# Patient Record
Sex: FEMALE | Race: WHITE | URBAN - METROPOLITAN AREA
[De-identification: names, ages, dates, MRNs, and addresses within clinical notes are randomized per-mention and may not be internally consistent; named-entity substitution may affect disease eponyms.]

---

## 2017-05-08 ENCOUNTER — TRANSFERRED RECORDS (OUTPATIENT)
Dept: HEALTH INFORMATION MANAGEMENT | Facility: CLINIC | Age: 72
End: 2017-05-08

## 2017-05-08 ENCOUNTER — HOSPITAL LABORATORY (OUTPATIENT)
Dept: OTHER | Facility: CLINIC | Age: 72
End: 2017-05-08

## 2017-05-08 LAB — HGB BLD-MCNC: 13.3 G/DL (ref 11.7–15.7)

## 2017-05-16 RX ORDER — METHYLPHENIDATE HYDROCHLORIDE 18 MG/1
18 TABLET, EXTENDED RELEASE ORAL EVERY MORNING
Status: ON HOLD | COMMUNITY
End: 2017-05-26

## 2017-05-16 RX ORDER — PRENATAL VIT/IRON FUM/FOLIC AC 27MG-0.8MG
1 TABLET ORAL DAILY
COMMUNITY

## 2017-05-16 RX ORDER — LOSARTAN POTASSIUM AND HYDROCHLOROTHIAZIDE 25; 100 MG/1; MG/1
1 TABLET ORAL EVERY MORNING
COMMUNITY

## 2017-05-16 RX ORDER — ALBUTEROL SULFATE 90 UG/1
2 AEROSOL, METERED RESPIRATORY (INHALATION) EVERY 6 HOURS PRN
COMMUNITY

## 2017-05-16 RX ORDER — BUDESONIDE AND FORMOTEROL FUMARATE DIHYDRATE 80; 4.5 UG/1; UG/1
2 AEROSOL RESPIRATORY (INHALATION) 2 TIMES DAILY
COMMUNITY

## 2017-05-22 ENCOUNTER — APPOINTMENT (OUTPATIENT)
Dept: GENERAL RADIOLOGY | Facility: CLINIC | Age: 72
DRG: 470 | End: 2017-05-22
Attending: ORTHOPAEDIC SURGERY
Payer: MEDICARE

## 2017-05-22 ENCOUNTER — HOSPITAL ENCOUNTER (INPATIENT)
Facility: CLINIC | Age: 72
LOS: 4 days | Discharge: NURSING, REHAB OR LONG-TERM CARE | DRG: 470 | End: 2017-05-26
Attending: ORTHOPAEDIC SURGERY | Admitting: ORTHOPAEDIC SURGERY
Payer: MEDICARE

## 2017-05-22 ENCOUNTER — ANESTHESIA (OUTPATIENT)
Dept: SURGERY | Facility: CLINIC | Age: 72
DRG: 470 | End: 2017-05-22
Payer: MEDICARE

## 2017-05-22 ENCOUNTER — ANESTHESIA EVENT (OUTPATIENT)
Dept: SURGERY | Facility: CLINIC | Age: 72
DRG: 470 | End: 2017-05-22
Payer: MEDICARE

## 2017-05-22 ENCOUNTER — APPOINTMENT (OUTPATIENT)
Dept: PHYSICAL THERAPY | Facility: CLINIC | Age: 72
DRG: 470 | End: 2017-05-22
Attending: ORTHOPAEDIC SURGERY
Payer: MEDICARE

## 2017-05-22 DIAGNOSIS — D62 ANEMIA DUE TO BLOOD LOSS, ACUTE: ICD-10-CM

## 2017-05-22 DIAGNOSIS — Z96.652 STATUS POST TOTAL LEFT KNEE REPLACEMENT: Primary | ICD-10-CM

## 2017-05-22 DIAGNOSIS — F98.8 ATTENTION DEFICIT DISORDER: ICD-10-CM

## 2017-05-22 PROBLEM — Z96.659 S/P TOTAL KNEE ARTHROPLASTY: Status: ACTIVE | Noted: 2017-05-22

## 2017-05-22 LAB
ANION GAP SERPL CALCULATED.3IONS-SCNC: 8 MMOL/L (ref 3–14)
BUN SERPL-MCNC: 16 MG/DL (ref 7–30)
CALCIUM SERPL-MCNC: 9.5 MG/DL (ref 8.5–10.1)
CHLORIDE SERPL-SCNC: 104 MMOL/L (ref 94–109)
CO2 SERPL-SCNC: 28 MMOL/L (ref 20–32)
CREAT SERPL-MCNC: 0.59 MG/DL (ref 0.52–1.04)
GFR SERPL CREATININE-BSD FRML MDRD: NORMAL ML/MIN/1.7M2
GLUCOSE SERPL-MCNC: 97 MG/DL (ref 70–99)
HGB BLD-MCNC: 12.4 G/DL (ref 11.7–15.7)
PLATELET # BLD AUTO: 255 10E9/L (ref 150–450)
POTASSIUM SERPL-SCNC: 3.8 MMOL/L (ref 3.4–5.3)
SODIUM SERPL-SCNC: 140 MMOL/L (ref 133–144)

## 2017-05-22 PROCEDURE — 80048 BASIC METABOLIC PNL TOTAL CA: CPT | Performed by: ORTHOPAEDIC SURGERY

## 2017-05-22 PROCEDURE — 25000132 ZZH RX MED GY IP 250 OP 250 PS 637: Mod: GY | Performed by: PHYSICIAN ASSISTANT

## 2017-05-22 PROCEDURE — 25000125 ZZHC RX 250: Performed by: ANESTHESIOLOGY

## 2017-05-22 PROCEDURE — 40000986 XR KNEE PORT LT 1/2 VW: Mod: LT

## 2017-05-22 PROCEDURE — 71000013 ZZH RECOVERY PHASE 1 LEVEL 1 EA ADDTL HR: Performed by: ORTHOPAEDIC SURGERY

## 2017-05-22 PROCEDURE — 25000128 H RX IP 250 OP 636: Performed by: PHYSICIAN ASSISTANT

## 2017-05-22 PROCEDURE — C1776 JOINT DEVICE (IMPLANTABLE): HCPCS | Performed by: ORTHOPAEDIC SURGERY

## 2017-05-22 PROCEDURE — 97116 GAIT TRAINING THERAPY: CPT | Mod: GP | Performed by: PHYSICAL THERAPIST

## 2017-05-22 PROCEDURE — 40000193 ZZH STATISTIC PT WARD VISIT: Performed by: PHYSICAL THERAPIST

## 2017-05-22 PROCEDURE — A9270 NON-COVERED ITEM OR SERVICE: HCPCS | Mod: GY | Performed by: ORTHOPAEDIC SURGERY

## 2017-05-22 PROCEDURE — 27810169 ZZH OR IMPLANT GENERAL: Performed by: ORTHOPAEDIC SURGERY

## 2017-05-22 PROCEDURE — 99207 ZZC CONSULT E&M CHANGED TO INITIAL LEVEL: CPT | Performed by: PHYSICIAN ASSISTANT

## 2017-05-22 PROCEDURE — 27210794 ZZH OR GENERAL SUPPLY STERILE: Performed by: ORTHOPAEDIC SURGERY

## 2017-05-22 PROCEDURE — 25000128 H RX IP 250 OP 636: Performed by: NURSE ANESTHETIST, CERTIFIED REGISTERED

## 2017-05-22 PROCEDURE — 25800025 ZZH RX 258: Performed by: ORTHOPAEDIC SURGERY

## 2017-05-22 PROCEDURE — 25000132 ZZH RX MED GY IP 250 OP 250 PS 637: Mod: GY | Performed by: ORTHOPAEDIC SURGERY

## 2017-05-22 PROCEDURE — 25000125 ZZHC RX 250: Performed by: ORTHOPAEDIC SURGERY

## 2017-05-22 PROCEDURE — 25000128 H RX IP 250 OP 636: Performed by: ORTHOPAEDIC SURGERY

## 2017-05-22 PROCEDURE — 25000128 H RX IP 250 OP 636: Performed by: ANESTHESIOLOGY

## 2017-05-22 PROCEDURE — 25000125 ZZHC RX 250: Performed by: NURSE ANESTHETIST, CERTIFIED REGISTERED

## 2017-05-22 PROCEDURE — 27210995 ZZH RX 272: Performed by: ORTHOPAEDIC SURGERY

## 2017-05-22 PROCEDURE — 71000012 ZZH RECOVERY PHASE 1 LEVEL 1 FIRST HR: Performed by: ORTHOPAEDIC SURGERY

## 2017-05-22 PROCEDURE — 12000007 ZZH R&B INTERMEDIATE

## 2017-05-22 PROCEDURE — 97161 PT EVAL LOW COMPLEX 20 MIN: CPT | Mod: GP | Performed by: PHYSICAL THERAPIST

## 2017-05-22 PROCEDURE — 0SRD0J9 REPLACEMENT OF LEFT KNEE JOINT WITH SYNTHETIC SUBSTITUTE, CEMENTED, OPEN APPROACH: ICD-10-PCS | Performed by: ORTHOPAEDIC SURGERY

## 2017-05-22 PROCEDURE — A9270 NON-COVERED ITEM OR SERVICE: HCPCS | Mod: GY | Performed by: PHYSICIAN ASSISTANT

## 2017-05-22 PROCEDURE — 85049 AUTOMATED PLATELET COUNT: CPT | Performed by: ORTHOPAEDIC SURGERY

## 2017-05-22 PROCEDURE — S0077 INJECTION, CLINDAMYCIN PHOSP: HCPCS | Performed by: ORTHOPAEDIC SURGERY

## 2017-05-22 PROCEDURE — 36000063 ZZH SURGERY LEVEL 4 EA 15 ADDTL MIN: Performed by: ORTHOPAEDIC SURGERY

## 2017-05-22 PROCEDURE — 36000093 ZZH SURGERY LEVEL 4 1ST 30 MIN: Performed by: ORTHOPAEDIC SURGERY

## 2017-05-22 PROCEDURE — 37000008 ZZH ANESTHESIA TECHNICAL FEE, 1ST 30 MIN: Performed by: ORTHOPAEDIC SURGERY

## 2017-05-22 PROCEDURE — 37000009 ZZH ANESTHESIA TECHNICAL FEE, EACH ADDTL 15 MIN: Performed by: ORTHOPAEDIC SURGERY

## 2017-05-22 PROCEDURE — 36415 COLL VENOUS BLD VENIPUNCTURE: CPT | Performed by: ORTHOPAEDIC SURGERY

## 2017-05-22 PROCEDURE — 85018 HEMOGLOBIN: CPT | Performed by: ORTHOPAEDIC SURGERY

## 2017-05-22 PROCEDURE — 97110 THERAPEUTIC EXERCISES: CPT | Mod: GP | Performed by: PHYSICAL THERAPIST

## 2017-05-22 PROCEDURE — 27110028 ZZH OR GENERAL SUPPLY NON-STERILE: Performed by: ORTHOPAEDIC SURGERY

## 2017-05-22 PROCEDURE — 99222 1ST HOSP IP/OBS MODERATE 55: CPT | Performed by: PHYSICIAN ASSISTANT

## 2017-05-22 PROCEDURE — 40000170 ZZH STATISTIC PRE-PROCEDURE ASSESSMENT II: Performed by: ORTHOPAEDIC SURGERY

## 2017-05-22 PROCEDURE — 97530 THERAPEUTIC ACTIVITIES: CPT | Mod: GP | Performed by: PHYSICAL THERAPIST

## 2017-05-22 DEVICE — IMP COMP PATELLA GENESIS II 13X29MM 71420574: Type: IMPLANTABLE DEVICE | Site: KNEE | Status: FUNCTIONAL

## 2017-05-22 DEVICE — IMP BASEPLATE TIBIAL GENESIS II SZ 2 LT TI 71420162: Type: IMPLANTABLE DEVICE | Site: KNEE | Status: FUNCTIONAL

## 2017-05-22 DEVICE — IMPLANTABLE DEVICE: Type: IMPLANTABLE DEVICE | Site: KNEE | Status: FUNCTIONAL

## 2017-05-22 DEVICE — BONE CEMENT RADIOPAQUE SIMPLEX HV FULL DOSE 6194-1-001: Type: IMPLANTABLE DEVICE | Site: KNEE | Status: FUNCTIONAL

## 2017-05-22 DEVICE — IMP COMP FEMORAL S&N LEGION CR NP NARROW 4 LT 71933641: Type: IMPLANTABLE DEVICE | Site: KNEE | Status: FUNCTIONAL

## 2017-05-22 RX ORDER — DIPHENHYDRAMINE HCL 12.5MG/5ML
12.5 LIQUID (ML) ORAL EVERY 6 HOURS PRN
Status: DISCONTINUED | OUTPATIENT
Start: 2017-05-22 | End: 2017-05-26 | Stop reason: HOSPADM

## 2017-05-22 RX ORDER — ONDANSETRON 2 MG/ML
4 INJECTION INTRAMUSCULAR; INTRAVENOUS EVERY 6 HOURS PRN
Status: DISCONTINUED | OUTPATIENT
Start: 2017-05-22 | End: 2017-05-26 | Stop reason: HOSPADM

## 2017-05-22 RX ORDER — ONDANSETRON 2 MG/ML
INJECTION INTRAMUSCULAR; INTRAVENOUS PRN
Status: DISCONTINUED | OUTPATIENT
Start: 2017-05-22 | End: 2017-05-22

## 2017-05-22 RX ORDER — LIDOCAINE 40 MG/G
CREAM TOPICAL
Status: DISCONTINUED | OUTPATIENT
Start: 2017-05-22 | End: 2017-05-26 | Stop reason: HOSPADM

## 2017-05-22 RX ORDER — SODIUM CHLORIDE, SODIUM LACTATE, POTASSIUM CHLORIDE, CALCIUM CHLORIDE 600; 310; 30; 20 MG/100ML; MG/100ML; MG/100ML; MG/100ML
INJECTION, SOLUTION INTRAVENOUS CONTINUOUS
Status: DISCONTINUED | OUTPATIENT
Start: 2017-05-22 | End: 2017-05-22 | Stop reason: HOSPADM

## 2017-05-22 RX ORDER — BUDESONIDE AND FORMOTEROL FUMARATE DIHYDRATE 80; 4.5 UG/1; UG/1
2 AEROSOL RESPIRATORY (INHALATION) 2 TIMES DAILY
Status: DISCONTINUED | OUTPATIENT
Start: 2017-05-22 | End: 2017-05-26 | Stop reason: HOSPADM

## 2017-05-22 RX ORDER — ONDANSETRON 2 MG/ML
4 INJECTION INTRAMUSCULAR; INTRAVENOUS EVERY 30 MIN PRN
Status: DISCONTINUED | OUTPATIENT
Start: 2017-05-22 | End: 2017-05-22 | Stop reason: HOSPADM

## 2017-05-22 RX ORDER — LOSARTAN POTASSIUM 100 MG/1
100 TABLET ORAL DAILY
Status: DISCONTINUED | OUTPATIENT
Start: 2017-05-23 | End: 2017-05-26 | Stop reason: HOSPADM

## 2017-05-22 RX ORDER — PROCHLORPERAZINE MALEATE 5 MG
5 TABLET ORAL EVERY 6 HOURS PRN
Status: DISCONTINUED | OUTPATIENT
Start: 2017-05-22 | End: 2017-05-26 | Stop reason: HOSPADM

## 2017-05-22 RX ORDER — FENTANYL CITRATE 50 UG/ML
INJECTION, SOLUTION INTRAMUSCULAR; INTRAVENOUS PRN
Status: DISCONTINUED | OUTPATIENT
Start: 2017-05-22 | End: 2017-05-22

## 2017-05-22 RX ORDER — FENTANYL CITRATE 50 UG/ML
25-50 INJECTION, SOLUTION INTRAMUSCULAR; INTRAVENOUS
Status: DISCONTINUED | OUTPATIENT
Start: 2017-05-22 | End: 2017-05-22 | Stop reason: HOSPADM

## 2017-05-22 RX ORDER — HYDRALAZINE HYDROCHLORIDE 20 MG/ML
10 INJECTION INTRAMUSCULAR; INTRAVENOUS EVERY 4 HOURS PRN
Status: DISCONTINUED | OUTPATIENT
Start: 2017-05-22 | End: 2017-05-26 | Stop reason: HOSPADM

## 2017-05-22 RX ORDER — LEVOTHYROXINE SODIUM 50 UG/1
50 TABLET ORAL
Status: DISCONTINUED | OUTPATIENT
Start: 2017-05-23 | End: 2017-05-26 | Stop reason: HOSPADM

## 2017-05-22 RX ORDER — EPHEDRINE SULFATE 50 MG/ML
INJECTION, SOLUTION INTRAMUSCULAR; INTRAVENOUS; SUBCUTANEOUS PRN
Status: DISCONTINUED | OUTPATIENT
Start: 2017-05-22 | End: 2017-05-22

## 2017-05-22 RX ORDER — ALBUTEROL SULFATE 0.83 MG/ML
2.5 SOLUTION RESPIRATORY (INHALATION) EVERY 4 HOURS PRN
Status: DISCONTINUED | OUTPATIENT
Start: 2017-05-22 | End: 2017-05-22 | Stop reason: HOSPADM

## 2017-05-22 RX ORDER — KETOROLAC TROMETHAMINE 15 MG/ML
15 INJECTION, SOLUTION INTRAMUSCULAR; INTRAVENOUS EVERY 6 HOURS
Status: COMPLETED | OUTPATIENT
Start: 2017-05-22 | End: 2017-05-23

## 2017-05-22 RX ORDER — CEFAZOLIN SODIUM 2 G/100ML
2 INJECTION, SOLUTION INTRAVENOUS
Status: COMPLETED | OUTPATIENT
Start: 2017-05-22 | End: 2017-05-22

## 2017-05-22 RX ORDER — CEFAZOLIN SODIUM 1 G/3ML
1 INJECTION, POWDER, FOR SOLUTION INTRAMUSCULAR; INTRAVENOUS SEE ADMIN INSTRUCTIONS
Status: DISCONTINUED | OUTPATIENT
Start: 2017-05-22 | End: 2017-05-22

## 2017-05-22 RX ORDER — FENTANYL CITRATE 50 UG/ML
25-50 INJECTION, SOLUTION INTRAMUSCULAR; INTRAVENOUS
Status: COMPLETED | OUTPATIENT
Start: 2017-05-22 | End: 2017-05-22

## 2017-05-22 RX ORDER — OXYCODONE HYDROCHLORIDE 5 MG/1
5-10 TABLET ORAL EVERY 4 HOURS PRN
Status: DISCONTINUED | OUTPATIENT
Start: 2017-05-22 | End: 2017-05-23

## 2017-05-22 RX ORDER — PROPOFOL 10 MG/ML
INJECTION, EMULSION INTRAVENOUS CONTINUOUS PRN
Status: DISCONTINUED | OUTPATIENT
Start: 2017-05-22 | End: 2017-05-22

## 2017-05-22 RX ORDER — METHYLPHENIDATE HYDROCHLORIDE 18 MG/1
18 TABLET, EXTENDED RELEASE ORAL EVERY MORNING
Status: DISCONTINUED | OUTPATIENT
Start: 2017-05-23 | End: 2017-05-22

## 2017-05-22 RX ORDER — SENNOSIDES 8.6 MG
1-2 TABLET ORAL 2 TIMES DAILY PRN
Status: DISCONTINUED | OUTPATIENT
Start: 2017-05-22 | End: 2017-05-26 | Stop reason: HOSPADM

## 2017-05-22 RX ORDER — ONDANSETRON 4 MG/1
4 TABLET, ORALLY DISINTEGRATING ORAL EVERY 6 HOURS PRN
Status: DISCONTINUED | OUTPATIENT
Start: 2017-05-22 | End: 2017-05-26 | Stop reason: HOSPADM

## 2017-05-22 RX ORDER — CALCIUM CARBONATE 500 MG/1
500 TABLET, CHEWABLE ORAL 3 TIMES DAILY PRN
Status: DISCONTINUED | OUTPATIENT
Start: 2017-05-22 | End: 2017-05-26 | Stop reason: HOSPADM

## 2017-05-22 RX ORDER — MAGNESIUM HYDROXIDE 1200 MG/15ML
LIQUID ORAL PRN
Status: DISCONTINUED | OUTPATIENT
Start: 2017-05-22 | End: 2017-05-22 | Stop reason: HOSPADM

## 2017-05-22 RX ORDER — DIPHENHYDRAMINE HYDROCHLORIDE 50 MG/ML
12.5 INJECTION INTRAMUSCULAR; INTRAVENOUS EVERY 6 HOURS PRN
Status: DISCONTINUED | OUTPATIENT
Start: 2017-05-22 | End: 2017-05-26 | Stop reason: HOSPADM

## 2017-05-22 RX ORDER — ACETAMINOPHEN 325 MG/1
975 TABLET ORAL EVERY 8 HOURS
Status: DISPENSED | OUTPATIENT
Start: 2017-05-22 | End: 2017-05-25

## 2017-05-22 RX ORDER — SODIUM CHLORIDE 9 MG/ML
INJECTION, SOLUTION INTRAVENOUS CONTINUOUS
Status: DISCONTINUED | OUTPATIENT
Start: 2017-05-22 | End: 2017-05-25

## 2017-05-22 RX ORDER — LOSARTAN POTASSIUM AND HYDROCHLOROTHIAZIDE 25; 100 MG/1; MG/1
1 TABLET ORAL EVERY MORNING
Status: DISCONTINUED | OUTPATIENT
Start: 2017-05-23 | End: 2017-05-22

## 2017-05-22 RX ORDER — ALBUTEROL SULFATE 90 UG/1
2 AEROSOL, METERED RESPIRATORY (INHALATION) EVERY 6 HOURS PRN
Status: DISCONTINUED | OUTPATIENT
Start: 2017-05-22 | End: 2017-05-26 | Stop reason: HOSPADM

## 2017-05-22 RX ORDER — NALOXONE HYDROCHLORIDE 0.4 MG/ML
.1-.4 INJECTION, SOLUTION INTRAMUSCULAR; INTRAVENOUS; SUBCUTANEOUS
Status: DISCONTINUED | OUTPATIENT
Start: 2017-05-22 | End: 2017-05-26 | Stop reason: HOSPADM

## 2017-05-22 RX ORDER — ALBUTEROL SULFATE 0.83 MG/ML
2.5 SOLUTION RESPIRATORY (INHALATION)
Status: DISCONTINUED | OUTPATIENT
Start: 2017-05-22 | End: 2017-05-26 | Stop reason: HOSPADM

## 2017-05-22 RX ORDER — BUPIVACAINE HYDROCHLORIDE 7.5 MG/ML
INJECTION, SOLUTION INTRASPINAL PRN
Status: DISCONTINUED | OUTPATIENT
Start: 2017-05-22 | End: 2017-05-22

## 2017-05-22 RX ORDER — CYCLOBENZAPRINE HCL 5 MG
5 TABLET ORAL 3 TIMES DAILY PRN
Status: DISCONTINUED | OUTPATIENT
Start: 2017-05-22 | End: 2017-05-26 | Stop reason: HOSPADM

## 2017-05-22 RX ORDER — LIDOCAINE HYDROCHLORIDE 20 MG/ML
INJECTION, SOLUTION INFILTRATION; PERINEURAL PRN
Status: DISCONTINUED | OUTPATIENT
Start: 2017-05-22 | End: 2017-05-22

## 2017-05-22 RX ORDER — POLYETHYLENE GLYCOL 3350 17 G/17G
17 POWDER, FOR SOLUTION ORAL DAILY PRN
Status: DISCONTINUED | OUTPATIENT
Start: 2017-05-22 | End: 2017-05-26 | Stop reason: HOSPADM

## 2017-05-22 RX ORDER — HYDROMORPHONE HYDROCHLORIDE 1 MG/ML
.3-.5 INJECTION, SOLUTION INTRAMUSCULAR; INTRAVENOUS; SUBCUTANEOUS EVERY 5 MIN PRN
Status: DISCONTINUED | OUTPATIENT
Start: 2017-05-22 | End: 2017-05-22 | Stop reason: HOSPADM

## 2017-05-22 RX ORDER — DESONIDE 0.5 MG/G
OINTMENT TOPICAL 2 TIMES DAILY PRN
COMMUNITY

## 2017-05-22 RX ORDER — METHYLPHENIDATE HYDROCHLORIDE 10 MG/1
10 TABLET ORAL EVERY 24 HOURS
Status: DISCONTINUED | OUTPATIENT
Start: 2017-05-22 | End: 2017-05-22

## 2017-05-22 RX ORDER — ONDANSETRON 4 MG/1
4 TABLET, ORALLY DISINTEGRATING ORAL EVERY 30 MIN PRN
Status: DISCONTINUED | OUTPATIENT
Start: 2017-05-22 | End: 2017-05-22 | Stop reason: HOSPADM

## 2017-05-22 RX ORDER — AMOXICILLIN 250 MG
1-2 CAPSULE ORAL 2 TIMES DAILY
Status: DISCONTINUED | OUTPATIENT
Start: 2017-05-22 | End: 2017-05-26 | Stop reason: HOSPADM

## 2017-05-22 RX ORDER — HYDRALAZINE HYDROCHLORIDE 20 MG/ML
2.5-5 INJECTION INTRAMUSCULAR; INTRAVENOUS EVERY 10 MIN PRN
Status: DISCONTINUED | OUTPATIENT
Start: 2017-05-22 | End: 2017-05-22 | Stop reason: HOSPADM

## 2017-05-22 RX ORDER — DEXTROSE MONOHYDRATE, SODIUM CHLORIDE, AND POTASSIUM CHLORIDE 50; 1.49; 4.5 G/1000ML; G/1000ML; G/1000ML
INJECTION, SOLUTION INTRAVENOUS CONTINUOUS
Status: DISCONTINUED | OUTPATIENT
Start: 2017-05-22 | End: 2017-05-22

## 2017-05-22 RX ORDER — CLINDAMYCIN PHOSPHATE 900 MG/50ML
900 INJECTION, SOLUTION INTRAVENOUS EVERY 8 HOURS
Status: COMPLETED | OUTPATIENT
Start: 2017-05-22 | End: 2017-05-23

## 2017-05-22 RX ORDER — SODIUM CHLORIDE, SODIUM LACTATE, POTASSIUM CHLORIDE, CALCIUM CHLORIDE 600; 310; 30; 20 MG/100ML; MG/100ML; MG/100ML; MG/100ML
INJECTION, SOLUTION INTRAVENOUS CONTINUOUS
Status: DISCONTINUED | OUTPATIENT
Start: 2017-05-22 | End: 2017-05-22

## 2017-05-22 RX ORDER — ACETAMINOPHEN 325 MG/1
650 TABLET ORAL EVERY 4 HOURS PRN
Status: DISCONTINUED | OUTPATIENT
Start: 2017-05-25 | End: 2017-05-26 | Stop reason: HOSPADM

## 2017-05-22 RX ORDER — HYDROMORPHONE HYDROCHLORIDE 1 MG/ML
.3-.5 INJECTION, SOLUTION INTRAMUSCULAR; INTRAVENOUS; SUBCUTANEOUS
Status: DISCONTINUED | OUTPATIENT
Start: 2017-05-22 | End: 2017-05-26 | Stop reason: HOSPADM

## 2017-05-22 RX ADMIN — MIDAZOLAM HYDROCHLORIDE 1 MG: 1 INJECTION, SOLUTION INTRAMUSCULAR; INTRAVENOUS at 09:50

## 2017-05-22 RX ADMIN — FLUOXETINE 20 MG: 20 CAPSULE ORAL at 21:10

## 2017-05-22 RX ADMIN — SENNOSIDES 1 TABLET: 8.6 TABLET, FILM COATED ORAL at 21:11

## 2017-05-22 RX ADMIN — ONDANSETRON 4 MG: 2 INJECTION INTRAMUSCULAR; INTRAVENOUS at 11:29

## 2017-05-22 RX ADMIN — BUPIVACAINE HYDROCHLORIDE IN DEXTROSE 12 MG: 7.5 INJECTION, SOLUTION SUBARACHNOID at 10:18

## 2017-05-22 RX ADMIN — KETOROLAC TROMETHAMINE 15 MG: 15 INJECTION, SOLUTION INTRAMUSCULAR; INTRAVENOUS at 16:24

## 2017-05-22 RX ADMIN — OXYCODONE HYDROCHLORIDE 5 MG: 5 TABLET ORAL at 17:56

## 2017-05-22 RX ADMIN — SODIUM CHLORIDE, POTASSIUM CHLORIDE, SODIUM LACTATE AND CALCIUM CHLORIDE: 600; 310; 30; 20 INJECTION, SOLUTION INTRAVENOUS at 11:51

## 2017-05-22 RX ADMIN — SODIUM CHLORIDE, POTASSIUM CHLORIDE, SODIUM LACTATE AND CALCIUM CHLORIDE: 600; 310; 30; 20 INJECTION, SOLUTION INTRAVENOUS at 10:03

## 2017-05-22 RX ADMIN — FENTANYL CITRATE 50 MCG: 50 INJECTION, SOLUTION INTRAMUSCULAR; INTRAVENOUS at 09:50

## 2017-05-22 RX ADMIN — Medication 10 MG: at 10:20

## 2017-05-22 RX ADMIN — SODIUM CHLORIDE: 9 INJECTION, SOLUTION INTRAVENOUS at 16:26

## 2017-05-22 RX ADMIN — KETOROLAC TROMETHAMINE 15 MG: 15 INJECTION, SOLUTION INTRAMUSCULAR; INTRAVENOUS at 21:10

## 2017-05-22 RX ADMIN — ACETAMINOPHEN 975 MG: 325 TABLET, FILM COATED ORAL at 21:10

## 2017-05-22 RX ADMIN — LIDOCAINE HYDROCHLORIDE 60 MG: 20 INJECTION, SOLUTION INFILTRATION; PERINEURAL at 10:24

## 2017-05-22 RX ADMIN — PHENYLEPHRINE HYDROCHLORIDE 0.25 MCG/KG/MIN: 10 INJECTION, SOLUTION INTRAMUSCULAR; INTRAVENOUS; SUBCUTANEOUS at 10:25

## 2017-05-22 RX ADMIN — FENTANYL CITRATE 50 MCG: 50 INJECTION, SOLUTION INTRAMUSCULAR; INTRAVENOUS at 10:34

## 2017-05-22 RX ADMIN — ROPIVACAINE HYDROCHLORIDE 20 ML GIVEN: 5 INJECTION, SOLUTION EPIDURAL; INFILTRATION; PERINEURAL at 10:18

## 2017-05-22 RX ADMIN — Medication 10 MG: at 10:55

## 2017-05-22 RX ADMIN — TRANEXAMIC ACID 1 G: 100 INJECTION, SOLUTION INTRAVENOUS at 11:19

## 2017-05-22 RX ADMIN — ACETAMINOPHEN 975 MG: 325 TABLET, FILM COATED ORAL at 14:56

## 2017-05-22 RX ADMIN — MIDAZOLAM HYDROCHLORIDE 2 MG: 1 INJECTION, SOLUTION INTRAMUSCULAR; INTRAVENOUS at 10:08

## 2017-05-22 RX ADMIN — CLINDAMYCIN PHOSPHATE 900 MG: 18 INJECTION, SOLUTION INTRAVENOUS at 19:14

## 2017-05-22 RX ADMIN — CEFAZOLIN SODIUM 2 G: 2 INJECTION, SOLUTION INTRAVENOUS at 10:10

## 2017-05-22 RX ADMIN — POTASSIUM CHLORIDE, DEXTROSE MONOHYDRATE AND SODIUM CHLORIDE: 150; 5; 450 INJECTION, SOLUTION INTRAVENOUS at 15:03

## 2017-05-22 RX ADMIN — FENTANYL CITRATE 50 MCG: 50 INJECTION, SOLUTION INTRAMUSCULAR; INTRAVENOUS at 10:08

## 2017-05-22 RX ADMIN — PROPOFOL 100 MCG/KG/MIN: 10 INJECTION, EMULSION INTRAVENOUS at 10:24

## 2017-05-22 RX ADMIN — LIDOCAINE HYDROCHLORIDE 1 ML: 10 INJECTION, SOLUTION EPIDURAL; INFILTRATION; INTRACAUDAL; PERINEURAL at 10:03

## 2017-05-22 RX ADMIN — Medication 5 MG: at 11:21

## 2017-05-22 ASSESSMENT — LIFESTYLE VARIABLES: TOBACCO_USE: 1

## 2017-05-22 ASSESSMENT — ENCOUNTER SYMPTOMS
DYSRHYTHMIAS: 0
SEIZURES: 0

## 2017-05-22 NOTE — ANESTHESIA PREPROCEDURE EVALUATION
Procedure: Procedure(s):  ARTHROPLASTY KNEE  Preop diagnosis: djd    Allergies   Allergen Reactions     Penicillins Shortness Of Breath and Rash     Atarax [Hydroxyzine]      Darvon [Propoxyphene] Nausea     Demerol [Meperidine] Nausea     Past Medical History:   Diagnosis Date     ADD (attention deficit disorder) without hyperactivity      Asthma     with chronic cough     Depression, major, single episode      Hx of colonic polyps      Hypertension      Hypothyroidism      Osteopenia of spine      PONV (postoperative nausea and vomiting)      Rosacea, acne      Torn medial meniscus     left knee     Past Surgical History:   Procedure Laterality Date     HYSTERECTOMY      SMOOTH, unilateral SO     jaw lift       LIPOSUCTION (LOCATION)      abdomen     Prior to Admission medications    Medication Sig Start Date End Date Taking? Authorizing Provider   SALINE NASAL SPRAY NA Spray 1 spray in nostril every morning ([facility] brand)   Yes Reported, Patient   MINOCYCLINE HCL PO Take 50 mg by mouth daily as needed (rash)   Yes Reported, Patient   desonide (DESOWEN) 0.05 % ointment Apply topically 2 times daily as needed   Yes Reported, Patient   IBUPROFEN PO Take 200 mg by mouth daily as needed for moderate pain   Yes Reported, Patient   Calcium Carbonate Antacid (TUMS PO) Take 2 chew tab by mouth daily as needed   Yes Reported, Patient   FLUoxetine HCl (PROZAC PO) Take 20 mg by mouth At Bedtime   Yes Reported, Patient   losartan-hydrochlorothiazide (HYZAAR) 100-25 MG per tablet Take 1 tablet by mouth every morning    Yes Reported, Patient   LEVOTHYROXINE SODIUM PO Take 50 mcg by mouth every morning    Yes Reported, Patient   Acetaminophen (TYLENOL PO) Take 500 mg by mouth daily as needed    Yes Reported, Patient   budesonide-formoterol (SYMBICORT) 80-4.5 MCG/ACT Inhaler Inhale 2 puffs into the lungs 2 times daily   Yes Reported, Patient   albuterol (PROAIR HFA/PROVENTIL HFA/VENTOLIN HFA) 108 (90 BASE) MCG/ACT Inhaler Inhale  2 puffs into the lungs every 6 hours as needed for shortness of breath / dyspnea or wheezing   Yes Reported, Patient   Methylphenidate HCl ER 18 MG TB24 Take 18 mg by mouth every morning    Yes Reported, Patient   METHYLPHENIDATE HCL PO Take 10 mg by mouth every 24 hours (in the late afternoon between 14:00-15:00)   Yes Reported, Patient   polyethylene glycol 0.4%- propylene glycol 0.3% (SYSTANE ULTRA) 0.4-0.3 % SOLN ophthalmic solution Place 1 drop into both eyes daily   Yes Reported, Patient   Prenatal Vit-Fe Fumarate-FA (PRENATAL MULTIVITAMIN  PLUS IRON) 27-0.8 MG TABS per tablet Take 1 tablet by mouth daily   Yes Reported, Patient     Current Facility-Administered Medications Ordered in Epic   Medication Dose Route Frequency Last Rate Last Dose     ceFAZolin sodium-dextrose (ANCEF) infusion 2 g  2 g Intravenous Pre-Op/Pre-procedure x 1 dose         ceFAZolin (ANCEF) 1 g vial to attach to  ml bag for ADULT or 50 ml bag for PEDS  1 g Intravenous See Admin Instructions         tranexamic acid (CYKLOKAPRON) 1 g in NaCl 0.9 % 60 mL bolus  1 g Intravenous Once         lidocaine 1 % 1 mL  1 mL Other Q1H PRN         lactated ringers infusion   Intravenous Continuous         No current [facility]-ordered outpatient prescriptions on file.     Wt Readings from Last 1 Encounters:   05/22/17 60.3 kg (133 lb)     Temp Readings from Last 1 Encounters:   05/22/17 36.4  C (97.5  F) (Temporal)     BP Readings from Last 6 Encounters:   05/22/17 138/67     Pulse Readings from Last 4 Encounters:   No data found for Pulse     Resp Readings from Last 1 Encounters:   05/22/17 12     SpO2 Readings from Last 1 Encounters:   05/22/17 97%     Recent Labs   Lab Test  05/08/17   1150   HGB  13.3         ECG: NSR, no st or twave abnormalities.         Anesthesia Evaluation     . Pt has had prior anesthetic. Type: General    History of anesthetic complications   - PONV        ROS/MED HX    ENT/Pulmonary:     (+)tobacco use, Past use Mild  Persistent asthma Treatment: Inhaled steroids,  , . .   (-) sleep apnea   Neurologic:     (+)other neuro    (-) seizures, CVA and migrainesSpinal cord injury: ADHD.   Cardiovascular:     (+) hypertension----. : . . . :. .      (-) CAD, CHF, arrhythmias, irregular heartbeat/palpitations, valvular problems/murmurs, dyslipidemia and PVD   METS/Exercise Tolerance:  3 - Able to walk 1-2 blocks without stopping   Hematologic:        (-) history of blood clots, anemia and other hematologic disorder   Musculoskeletal:   (+) arthritis, , , -       GI/Hepatic:        (-) GERD and liver disease   Renal/Genitourinary:      (-) renal disease   Endo:     (+) thyroid problem hypothyroidism, .   (-) Type I DM, Type II DM and obesity   Psychiatric:     (+) psychiatric history depression      Infectious Disease:        (-) Recent Fever   Malignancy:         Other:                     Physical Exam  Normal systems: cardiovascular, pulmonary and dental    Airway   Mallampati: II  TM distance: >3 FB  Neck ROM: full    Dental     Cardiovascular   Rhythm and rate: regular and normal  (-) no murmur    Pulmonary                     Anesthesia Plan      History & Physical Review      ASA Status:  2 .    NPO Status:  > 8 hours    Plan for Spinal   PONV prophylaxis:  Ondansetron (or other 5HT-3)  Propofol infusion      Postoperative Care  Postoperative pain management:  IV analgesics and Oral pain medications.      Consents  Anesthetic plan, risks, benefits and alternatives discussed with:  Patient..                          .

## 2017-05-22 NOTE — PLAN OF CARE
Problem: Goal Outcome Summary  Goal: Goal Outcome Summary  PT: Orders received, evaluation completed, and treatment initiated. Patient is a 72 y/o female POD # 0 L TKA. Patient lives with her spouse, 3 stairs to enter/exit house, all needs are met on the main level. Patient reports completing functional mobility independently without use of an AD at baseline.      BPCI Care Plan: TCU then OP PT     Current Functional Status: Patient performed bed mobility, SBA. Sit <> stand with use of FWW and CGA. Patient ambulated 30 feet with FWW and CGA, noted no L knee buckling with KI donned. L knee ROM: 14-88 degrees. Recommend nursing utilize KI when assisting patient with OOB mobility.      Barriers to Plan: None indicated at this time.

## 2017-05-22 NOTE — PROGRESS NOTES
Admission medication history interview status for the 5/22/2017  admission is complete. See EPIC admission navigator for prior to admission medications     Medication history source reliability:Good    Medication history interview source(s):Patient    Medication history resources (including written lists, pill bottles, clinic record):Patient mailed in a med list prior to day of procedure,    Primary pharmacy.[facility], [place] ([phone])    Additional medication history information not noted on PTA med list :None    Time spent in this activity: 30 minutes    Prior to Admission medications    Medication Sig Last Dose Taking? Auth Provider   SALINE NASAL SPRAY NA Spray 1 spray in nostril every morning ([facility] brand) 5/22/2017 at 0600 Yes Reported, Patient   MINOCYCLINE HCL PO Take 50 mg by mouth daily as needed (rash) February 2017 at prn Yes Reported, Patient   desonide (DESOWEN) 0.05 % ointment Apply topically 2 times daily as needed 5/22/2017 at 0600 Yes Reported, Patient   IBUPROFEN PO Take 200 mg by mouth daily as needed for moderate pain Over 1 week ago at prn Yes Reported, Patient   Calcium Carbonate Antacid (TUMS PO) Take 2 chew tab by mouth daily as needed 5/22/2017 at 0000 Yes Reported, Patient   FLUoxetine HCl (PROZAC PO) Take 20 mg by mouth At Bedtime 5/21/2017 at 2100 Yes Reported, Patient   losartan-hydrochlorothiazide (HYZAAR) 100-25 MG per tablet Take 1 tablet by mouth every morning  5/21/2017 at 0830 Yes Reported, Patient   LEVOTHYROXINE SODIUM PO Take 50 mcg by mouth every morning  5/22/2017 at 0600 Yes Reported, Patient   Acetaminophen (TYLENOL PO) Take 500 mg by mouth daily as needed  Over 1 week ago at prn Yes Reported, Patient   budesonide-formoterol (SYMBICORT) 80-4.5 MCG/ACT Inhaler Inhale 2 puffs into the lungs 2 times daily 5/22/2017 at 0615 Yes Reported, Patient   albuterol (PROAIR HFA/PROVENTIL HFA/VENTOLIN HFA) 108 (90 BASE) MCG/ACT Inhaler Inhale 2 puffs into the lungs every 6  hours as needed for shortness of breath / dyspnea or wheezing Over 5 weeks ago at prn Yes Reported, Patient   Methylphenidate HCl ER 18 MG TB24 Take 18 mg by mouth every morning  5/10/2017 at am Yes Reported, Patient   METHYLPHENIDATE HCL PO Take 10 mg by mouth every 24 hours (in the late afternoon between 14:00-15:00) 5/18/2017 at 14:00 Yes Reported, Patient   polyethylene glycol 0.4%- propylene glycol 0.3% (SYSTANE ULTRA) 0.4-0.3 % SOLN ophthalmic solution Place 1 drop into both eyes daily 5/22/2017 at 0600 Yes Reported, Patient   Prenatal Vit-Fe Fumarate-FA (PRENATAL MULTIVITAMIN  PLUS IRON) 27-0.8 MG TABS per tablet Take 1 tablet by mouth daily 5/21/2017 at 0830 Yes Reported, Patient

## 2017-05-22 NOTE — PLAN OF CARE
Problem: Goal Outcome Summary  Goal: Goal Outcome Summary  Outcome: Improving  Up on floor close to 2pm today. A&O. CMS intact, dressing c,d,i. HVAC patent. DTV. IV infusing. Rates pain at a 2. Continue to monitor.

## 2017-05-22 NOTE — IP AVS SNAPSHOT
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     SB1.1 5/23/2017  8:43 AM Jojo Jolley LSW  Sign            Progress Notes by Jeanie Huitron PT at 5/22/2017  5:53 PM     Author:  Jeanie Huitron PT Service:  (none) Author Type:  Physical Therapist    Filed:  5/22/2017  5:53 PM Date of Service:  5/22/2017  5:53 PM Creation Time:  5/22/2017  5:53 PM    Status:  Signed  :  Jeanie Huitron, PT (Physical Therapist)          05/22/17 1705   Quick Adds   Type of Visit Initial PT Evaluation   Living Environment   Lives With spouse   Living Arrangements house  (Townhouse)   Number of Stairs to Enter Home 3  (1 railing. )   Number of Stairs Within Home 11  (B railings.)   Transportation Available car;family or friend will provide  (Pt drove prior to hospitalization. )   Living Environment Comment Pt's spouse is able to assist at time of discharge. 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    Self-Care   Usual Activity Tolerance good  (Limited by pain. )   Current Activity Tolerance moderate   Regular Exercise yes   Activity/Exercise Type other (see comments)  (Pilates)   Equipment Currently Used at Home none   Functional Level Prior   Ambulation 0-->independent   Transferring 0-->independent   Fall history within last six months yes   Number of times patient has fallen within last six months (Episodes of fainting. )   Prior Functional Level Comment Pt completed functional mobility independently without use of an AD at baseline.    General Information   Onset of Illness/Injury or Date of Surgery - Date 05/22/17   Referring Physician Oliver Daniels MD   Patient/Family Goals Statement None stated.    Pertinent History of Current Problem (include personal factors and/or comorbidities that impact the POC) 70 y/o female POD # 0 L TKA.    Precautions/Limitations fall precautions  (KI on at night. )   Weight-Bearing Status - LLE weight-bearing as tolerated   General Observations Pt in supine upon arrival of therapist.    General Info Comments Ambulate with assist.    Cognitive Status Examination   Orientation orientation to person, place and time   Level of Consciousness alert   Follows Commands and Answers Questions 100% of the time   Personal Safety and Judgment intact   Pain Assessment   Patient Currently in Pain (L knee pain at rest: 3-4/10 )  "  Integumentary/Edema   Integumentary/Edema Comments L knee incision covered with dressing, hemovac intact.    Posture    Posture Comments No deficits noted.    Range of Motion (ROM)   ROM Comment L knee ROM: 14-88 degrees otherwise B LEs WFL.    Strength   Strength Comments Not formally assessed, pt demonstrated at least 3.5 grossly in B LEs with functional mobility.    Bed Mobility   Bed Mobility Comments Supine <> sit, SBA.    Transfer Skills   Transfer Comments Sit <> stand with FWW and CGA.    Gait   Gait Comments Pt amb 10' with FWW and CGA, noted on L knee buckling with KI donned.    Balance   Balance Comments Noted good sitting and standing balance at FWW.    Sensory Examination   Sensory Perception Comments Pt denied numbness/tingling in B LEs.    Modality Interventions   Planned Modality Interventions Cryotherapy   Planned Modality Interventions Comments PRN.    General Therapy Interventions   Planned Therapy Interventions bed mobility training;gait training;ROM;strengthening;transfer training   Clinical Impression   Criteria for Skilled Therapeutic Intervention yes, treatment indicated   PT Diagnosis Difficulty with gait.    Influenced by the following impairments Pain, Impaired L knee ROM, Decreased strength, decreased activity tolerance   Functional limitations due to impairments Limited functional mobility requiring AD and assist.    Clinical Presentation Stable/Uncomplicated   Clinical Presentation Rationale Based on PMH, current presentation and social support.    Clinical Decision Making (Complexity) Low complexity   Therapy Frequency` 2 times/day   Predicted Duration of Therapy Intervention (days/wks) 4 days   Anticipated Equipment Needs at Discharge walker  (Defer to TCU. )   Anticipated Discharge Disposition Transitional Care Facility   Risk & Benefits of therapy have been explained Yes   Patient, Family & other staff in agreement with plan of care Yes   [facility] AM-PAC  \"6 Clicks\" V.2 " Basic Mobility Inpatient Short Form   1. Turning from your back to your side while in a flat bed without using bedrails? 4 - None   2. Moving from lying on your back to sitting on the side of a flat bed without using bedrails? 4 - None   3. Moving to and from a bed to a chair (including a wheelchair)? 3 - A Little   4. Standing up from a chair using your arms (e.g., wheelchair, or bedside chair)? 3 - A Little   5. To walk in hospital room? 3 - A Little   6. Climbing 3-5 steps with a railing? 2 - A Lot   Basic Mobility Raw Score (Score out of 24.Lower scores equate to lower levels of function) 19   Total Evaluation Time   Total Evaluation Time (Minutes) 10[JH1.1]        Revision History        User Key Date/Time User Provider Type Action    > JH1.1 5/22/2017  5:53 PM Jeanie Huitron, PT Physical Therapist Sign            Progress Notes by Cherry Yoder at 5/22/2017  8:44 AM     Author:  Cherry Yoder Service:  (none) Author Type:  (none)    Filed:  5/22/2017  8:45 AM Date of Service:  5/22/2017  8:44 AM Creation Time:  5/22/2017  8:44 AM    Status:  Signed :  Cherry Yoder         Admission medication history interview status for the 5/22/2017  admission is complete. See EPIC admission navigator for prior to admission medications     Medication history source reliability:Good    Medication history interview source(s):Patient    Medication history resources (including written lists, pill bottles, clinic record):Patient mailed in a med list prior to day of procedure,    Primary pharmacy.[facility], [place] ([phone])    Additional medication history information not noted on PTA med list :None    Time spent in this activity: 30 minutes    Prior to Admission medications    Medication Sig Last Dose Taking? Auth Provider   SALINE NASAL SPRAY NA Spray 1 spray in nostril every morning ([facility] brand) 5/22/2017 at 0600 Yes Reported, Patient   MINOCYCLINE HCL PO Take 50 mg by mouth daily as needed  (rash) February 2017 at prn Yes Reported, Patient   desonide (DESOWEN) 0.05 % ointment Apply topically 2 times daily as needed 5/22/2017 at 0600 Yes Reported, Patient   IBUPROFEN PO Take 200 mg by mouth daily as needed for moderate pain Over 1 week ago at prn Yes Reported, Patient   Calcium Carbonate Antacid (TUMS PO) Take 2 chew tab by mouth daily as needed 5/22/2017 at 0000 Yes Reported, Patient   FLUoxetine HCl (PROZAC PO) Take 20 mg by mouth At Bedtime 5/21/2017 at 2100 Yes Reported, Patient   losartan-hydrochlorothiazide (HYZAAR) 100-25 MG per tablet Take 1 tablet by mouth every morning  5/21/2017 at 0830 Yes Reported, Patient   LEVOTHYROXINE SODIUM PO Take 50 mcg by mouth every morning  5/22/2017 at 0600 Yes Reported, Patient   Acetaminophen (TYLENOL PO) Take 500 mg by mouth daily as needed  Over 1 week ago at prn Yes Reported, Patient   budesonide-formoterol (SYMBICORT) 80-4.5 MCG/ACT Inhaler Inhale 2 puffs into the lungs 2 times daily 5/22/2017 at 0615 Yes Reported, Patient   albuterol (PROAIR HFA/PROVENTIL HFA/VENTOLIN HFA) 108 (90 BASE) MCG/ACT Inhaler Inhale 2 puffs into the lungs every 6 hours as needed for shortness of breath / dyspnea or wheezing Over 5 weeks ago at prn Yes Reported, Patient   Methylphenidate HCl ER 18 MG TB24 Take 18 mg by mouth every morning  5/10/2017 at am Yes Reported, Patient   METHYLPHENIDATE HCL PO Take 10 mg by mouth every 24 hours (in the late afternoon between 14:00-15:00) 5/18/2017 at 14:00 Yes Reported, Patient   polyethylene glycol 0.4%- propylene glycol 0.3% (SYSTANE ULTRA) 0.4-0.3 % SOLN ophthalmic solution Place 1 drop into both eyes daily 5/22/2017 at 0600 Yes Reported, Patient   Prenatal Vit-Fe Fumarate-FA (PRENATAL MULTIVITAMIN  PLUS IRON) 27-0.8 MG TABS per tablet Take 1 tablet by mouth daily 5/21/2017 at 0830 Yes Reported, Patient[CN1.1]            Revision History        User Key Date/Time User Provider Type Action    > CN1.1 5/22/2017  8:45 AM Cherry Yoder  (none) Sign                  Procedure Notes     No notes of this type exist for this encounter.         Progress Notes - Therapies (Notes from 05/21/17 through 05/24/17)      Progress Notes by Jeanie Huitron PT at 5/22/2017  5:53 PM     Author:  Jeanie Huitron PT Service:  (none) Author Type:  Physical Therapist    Filed:  5/22/2017  5:53 PM Date of Service:  5/22/2017  5:53 PM Creation Time:  5/22/2017  5:53 PM    Status:  Signed :  Jeanie Huitron PT (Physical Therapist)          05/22/17 1705   Quick Adds   Type of Visit Initial PT Evaluation   Living Environment   Lives With spouse   Living Arrangements house  ([facility])   Number of Stairs to Enter Home 3  (1 railing. )   Number of Stairs Within Home 11  (B railings.)   Transportation Available car;family or friend will provide  (Pt drove prior to hospitalization. )   Living Environment Comment Pt's spouse is able to assist at time of discharge. 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    Self-Care   Usual Activity Tolerance good  (Limited by pain. )   Current Activity Tolerance moderate   Regular Exercise yes   Activity/Exercise Type other (see comments)  (Pilates)   Equipment Currently Used at Home none   Functional Level Prior   Ambulation 0-->independent   Transferring 0-->independent   Fall history within last six months yes   Number of times patient has fallen within last six months (Episodes of fainting. )   Prior Functional Level Comment Pt completed functional mobility independently without use of an AD at baseline.    General Information   Onset of Illness/Injury or Date of Surgery - Date 05/22/17   Referring Physician Oliver Daniels MD   Patient/Family Goals Statement None stated.    Pertinent History of Current Problem (include personal factors and/or comorbidities that impact the POC) 70 y/o female POD # 0 L TKA.    Precautions/Limitations fall precautions  (KI on at night. )   Weight-Bearing Status - LLE  weight-bearing as tolerated   General Observations Pt in supine upon arrival of therapist.    General Info Comments Ambulate with assist.    Cognitive Status Examination   Orientation orientation to person, place and time   Level of Consciousness alert   Follows Commands and Answers Questions 100% of the time   Personal Safety and Judgment intact   Pain Assessment   Patient Currently in Pain (L knee pain at rest: 3-4/10 )   Integumentary/Edema   Integumentary/Edema Comments L knee incision covered with dressing, hemovac intact.    Posture    Posture Comments No deficits noted.    Range of Motion (ROM)   ROM Comment L knee ROM: 14-88 degrees otherwise B LEs WFL.    Strength   Strength Comments Not formally assessed, pt demonstrated at least 3.5 grossly in B LEs with functional mobility.    Bed Mobility   Bed Mobility Comments Supine <> sit, SBA.    Transfer Skills   Transfer Comments Sit <> stand with FWW and CGA.    Gait   Gait Comments Pt amb 10' with FWW and CGA, noted on L knee buckling with KI donned.    Balance   Balance Comments Noted good sitting and standing balance at FWW.    Sensory Examination   Sensory Perception Comments Pt denied numbness/tingling in B LEs.    Modality Interventions   Planned Modality Interventions Cryotherapy   Planned Modality Interventions Comments PRN.    General Therapy Interventions   Planned Therapy Interventions bed mobility training;gait training;ROM;strengthening;transfer training   Clinical Impression   Criteria for Skilled Therapeutic Intervention yes, treatment indicated   PT Diagnosis Difficulty with gait.    Influenced by the following impairments Pain, Impaired L knee ROM, Decreased strength, decreased activity tolerance   Functional limitations due to impairments Limited functional mobility requiring AD and assist.    Clinical Presentation Stable/Uncomplicated   Clinical Presentation Rationale Based on PMH, current presentation and social support.    Clinical Decision  "Making (Complexity) Low complexity   Therapy Frequency` 2 times/day   Predicted Duration of Therapy Intervention (days/wks) 4 days   Anticipated Equipment Needs at Discharge walker  (Defer to TCU. )   Anticipated Discharge Disposition Transitional Care Facility   Risk & Benefits of therapy have been explained Yes   Patient, Family & other staff in agreement with plan of care Yes   [facility] AM-PAC  \"6 Clicks\" V.2 Basic Mobility Inpatient Short Form   1. Turning from your back to your side while in a flat bed without using bedrails? 4 - None   2. Moving from lying on your back to sitting on the side of a flat bed without using bedrails? 4 - None   3. Moving to and from a bed to a chair (including a wheelchair)? 3 - A Little   4. Standing up from a chair using your arms (e.g., wheelchair, or bedside chair)? 3 - A Little   5. To walk in hospital room? 3 - A Little   6. Climbing 3-5 steps with a railing? 2 - A Lot   Basic Mobility Raw Score (Score out of 24.Lower scores equate to lower levels of function) 19   Total Evaluation Time   Total Evaluation Time (Minutes) 10[JH1.1]        Revision History        User Key Date/Time User Provider Type Action    > JH1.1 5/22/2017  5:53 PM Jeanie Huitron, PT Physical Therapist Sign            Progress Notes by Cherry Yoder at 5/22/2017  8:44 AM     Author:  Cherry Yoder Service:  (none) Author Type:  (none)    Filed:  5/22/2017  8:45 AM Date of Service:  5/22/2017  8:44 AM Creation Time:  5/22/2017  8:44 AM    Status:  Signed :  Cherry Yoder         Admission medication history interview status for the 5/22/2017  admission is complete. See EPIC admission navigator for prior to admission medications     Medication history source reliability:Good    Medication history interview source(s):Patient    Medication history resources (including written lists, pill bottles, clinic record):Patient mailed in a med list prior to day of procedure,    Primary " [facility] ([phone])    Additional medication history information not noted on PTA med list :None    Time spent in this activity: 30 minutes    Prior to Admission medications    Medication Sig Last Dose Taking? Auth Provider   SALINE NASAL SPRAY NA Spray 1 spray in nostril every morning ([facility] brand) 5/22/2017 at 0600 Yes Reported, Patient   MINOCYCLINE HCL PO Take 50 mg by mouth daily as needed (rash) February 2017 at prn Yes Reported, Patient   desonide (DESOWEN) 0.05 % ointment Apply topically 2 times daily as needed 5/22/2017 at 0600 Yes Reported, Patient   IBUPROFEN PO Take 200 mg by mouth daily as needed for moderate pain Over 1 week ago at prn Yes Reported, Patient   Calcium Carbonate Antacid (TUMS PO) Take 2 chew tab by mouth daily as needed 5/22/2017 at 0000 Yes Reported, Patient   FLUoxetine HCl (PROZAC PO) Take 20 mg by mouth At Bedtime 5/21/2017 at 2100 Yes Reported, Patient   losartan-hydrochlorothiazide (HYZAAR) 100-25 MG per tablet Take 1 tablet by mouth every morning  5/21/2017 at 0830 Yes Reported, Patient   LEVOTHYROXINE SODIUM PO Take 50 mcg by mouth every morning  5/22/2017 at 0600 Yes Reported, Patient   Acetaminophen (TYLENOL PO) Take 500 mg by mouth daily as needed  Over 1 week ago at prn Yes Reported, Patient   budesonide-formoterol (SYMBICORT) 80-4.5 MCG/ACT Inhaler Inhale 2 puffs into the lungs 2 times daily 5/22/2017 at 0615 Yes Reported, Patient   albuterol (PROAIR HFA/PROVENTIL HFA/VENTOLIN HFA) 108 (90 BASE) MCG/ACT Inhaler Inhale 2 puffs into the lungs every 6 hours as needed for shortness of breath / dyspnea or wheezing Over 5 weeks ago at prn Yes Reported, Patient   Methylphenidate HCl ER 18 MG TB24 Take 18 mg by mouth every morning  5/10/2017 at am Yes Reported, Patient   METHYLPHENIDATE HCL PO Take 10 mg by mouth every 24 hours (in the late afternoon between 14:00-15:00) 5/18/2017 at 14:00 Yes Reported, Patient   polyethylene glycol 0.4%- propylene  glycol 0.3% (SYSTANE ULTRA) 0.4-0.3 % SOLN ophthalmic solution Place 1 drop into both eyes daily 5/22/2017 at 0600 Yes Reported, Patient   Prenatal Vit-Fe Fumarate-FA (PRENATAL MULTIVITAMIN  PLUS IRON) 27-0.8 MG TABS per tablet Take 1 tablet by mouth daily 5/21/2017 at 0830 Yes Reported, Patient[CN1.1]            Revision History        User Key Date/Time User Provider Type Action    > CN1.1 5/22/2017  8:45 AM Cherry Yoder (none) Sign

## 2017-05-22 NOTE — ANESTHESIA POSTPROCEDURE EVALUATION
Patient: Jil Conde    Procedure(s):  LEFT TOTAL KNEE ARTHROPLASTY (SMITH & NEPHEW)^ - Wound Class: I-Clean    Diagnosis:djd  Diagnosis Additional Information: No value filed.    Anesthesia Type:  Spinal    Note:  Anesthesia Post Evaluation    Patient location during evaluation: PACU  Patient participation: Able to fully participate in evaluation  Level of consciousness: sleepy but conscious and responsive to verbal stimuli  Pain management: adequate  Airway patency: patent  Cardiovascular status: acceptable and hemodynamically stable  Respiratory status: acceptable and unassisted  Hydration status: acceptable  PONV: none     Anesthetic complications: None          Last vitals:  Vitals:    05/22/17 0858 05/22/17 1206   BP: 138/67 106/50   Resp: 12 16   Temp: 36.4  C (97.5  F) 36.2  C (97.2  F)   SpO2: 97% 96%         Electronically Signed By: Shubham Prather MD  May 22, 2017  12:32 PM

## 2017-05-22 NOTE — ANESTHESIA PROCEDURE NOTES
Peripheral nerve/Neuraxial procedure note : Adductor canal  Pre-Procedure  Performed by CALIN BLUM  Location: pre-op      Pre-Anesthestic Checklist: patient identified, IV checked, site marked, risks and benefits discussed, informed consent, monitors and equipment checked, pre-op evaluation, at physician/surgeon's request and post-op pain management    Timeout  Correct Patient: Yes   Correct Procedure: Yes   Correct Site: Yes   Correct Laterality: Yes   Correct Position: Yes   Site Marked: Yes   .   Procedure Documentation    .    Procedure:    Adductor canal.  Local skin infiltrated with 1 mL of 1% lidocaine.     Ultrasound used to identify targeted nerve, plexus, or vascular marker and placed a needle adjacent to it., Ultrasound was used to visualize the spread of the anesthetic in close proximity to the above stated nerve. A permanent image is entered into the patient's record.  Patient Prep;chlorhexidine gluconate and isopropyl alcohol.  .  Needle: insulated (21 G. 100 mm ). .  Spinal Needle: . . Insertion Method: Single Shot.     Assessment/Narrative  Paresthesias: No.  .  The placement was negative for: blood aspirated, painful injection and site bleeding.  Bolus given via needle..   Secured via.   Complications: none. Comments:  Bolus via needle, 20 ml of 0.5% Bupivacaine with 5 mcg/ml of 1:400,000 epinephrine.  Patient tolerated well, was mildly sedated but communicative throughout the procedure.   The surgeon has given a verbal order transferring care of this patient to me for the performance of regional analgesia block for post op pain control. It is requested of me because I am uniquely trained and qualified to perform this block and the surgeon is neither trained nor qualified to perform this procedure.

## 2017-05-22 NOTE — IP AVS SNAPSHOT
` ` Patient Information     Patient Name Sex     Jil Conde ([id number]) Female 1945       Room Bed    7277 2384-71      Patient Demographics     Address Phone    [address] [address] [phone] (Home)      Patient Ethnicity & Race     Ethnic Group Patient Race    American White      Emergency Contact(s)     Name Relation Home Work Mobile    Panda Conde Spouse [phone]      Documents on File        Status Date Received Description       Documents for the Patient    Consent for EHR Access Received 17     Insurance Card Received 17     External Medication Information Consent       Patient ID Received 17     [facility] Specified Other       Consent for Services/Privacy Notice - Hospital/Clinic Received 17     Privacy Notice - [place] Received 17     Insurance Card Received 17        Documents for the Encounter    H/P - History and Physical  17 [facility]- HISTORY AND PHYSICAL 2017    Plan of Care  17 BP PATIENT CARE PLAN    [facility] for Patient Signature Received 17 1MM    EKG Cardiac - HIM Scan  17 EKG- [facility]      Admission Information     Attending Provider Admitting Provider Admission Type Admission Date/Time    Oliver Daniels MD Anderson, John Theodore, MD Elective 17  0808    Discharge Date Hospital Service Auth/Cert Status Service Area     Surgery [facility] SERVICES    Unit Room/Bed Admission Status       [address] SPEC UNIT 5580/5507-01 Admission (Confirmed)       Admission     Complaint    djd, S/P total knee arthroplasty      Hospital Account     Name Acct ID Class Status Primary Coverage    Jil Conde [id number] Inpatient Open MEDICARE - MEDICARE            Guarantor Account (for Hospital Account #[id number])     Name Relation to Pt Service Area Active? Acct Type    Jli Conde Self FCS Yes Personal/Family    Address Phone          [address] [place] [address] [phone](H)              Coverage Information (for Hospital Account #[id number])     1. MEDICARE/MEDICARE     F/O Payor/Plan Precert #    MEDICARE/MEDICARE     Subscriber Subscriber #    Jil Conde [id number]    Address Phone    [employer]  [address]  [place] [address] [phone]          2. [place]     F/O Payor/Plan Precert #    [place]     Subscriber Subscriber #    Jil Conde [id number]    Address Phone    [address] [address]  [place], [place] [id number] [phone]

## 2017-05-22 NOTE — IP AVS SNAPSHOT
[address] Specialty [address]    [address] [place] [address]    Phone:  [phone]                                       After Visit Summary   5/22/2017    Jli Conde    MRN: [id number]           After Visit Summary Signature Page     I have received my discharge instructions, and my questions have been answered. I have discussed any challenges I see with this plan with the nurse or doctor.    ..........................................................................................................................................  Patient/Patient Representative Signature      ..........................................................................................................................................  Patient Representative Print Name and Relationship to Patient    ..................................................               ................................................  Date                                            Time    ..........................................................................................................................................  Reviewed by Signature/Title    ...................................................              ..............................................  Date                                                            Time

## 2017-05-22 NOTE — IP AVS SNAPSHOT
MRN:[id number]                      After Visit Summary   5/22/2017    Jil Conde    MRN: [id number]           Thank you!     Thank you for choosing [facility] for your care. Our goal is always to provide you with excellent care. Hearing back from our patients is one way we can continue to improve our services. Please take a few minutes to complete the written survey that you may receive in the mail after you visit with us. Thank you!        Patient Information     Date Of Birth          1945        Designated Caregiver       Most Recent Value    Caregiver    Will someone help with your care after discharge? yes    Name of designated caregiver Panda ()    Phone number of caregiver [phone]    Caregiver address [address] [place] [address]      About your hospital stay     You were admitted on:  May 22, 2017 You last received care in the:  [address] [facility]    You were discharged on:  May 26, 2017       Who to Call     For medical emergencies, please call 911.  For non-urgent questions about your medical care, please call your primary care provider or clinic, [phone]  For questions related to your surgery, please call your surgery clinic        Attending Provider     Provider Specialty    Oliver Daniels MD Orthopedics       Primary Care Provider Office Phone # Fax #    Dutch Sterling [phone]       [address] GARNER [address] [place] [id number]        After Care Instructions     Activity - Up ad charley           Advance Diet as Tolerated       Follow this diet upon discharge: Advance to a regular diet as tolerated            Daily weights       Call Provider for weight gain of more than 2 pounds per day or 5 pounds per week.            General info for SNF       Length of Stay Estimate: Short Term Care: Estimated # of Days 5-7  Condition at Discharge: Improving  Level of care:skilled    Rehabilitation Potential: Excellent  Admission H&P remains valid and up-to-date: Yes  Recent Chemotherapy: N/A  Use Nursing Home Standing Orders: N/A            Mantoux instructions       Give two-step Mantoux (PPD) Per Facility Policy Yes            Oxygen - Nasal cannula       2-4 Lpm by nasal cannula to keep O2 sats 90% or greater, wean as able.            Wound care (specify)       Site:   Left knee  Instructions:  Daily dressing changes                  Follow-up Appointments     Follow Up and recommended labs and tests       Follow up with shelter physician.  The following labs/tests are recommended: H&H in 3-5 days.                  Additional Services     Physical Therapy Adult Consult       Evaluate and treat as clinically indicated.    Reason: left TKA                  Further instructions from your care team       DISCHARGE INSTRUCTIONS AFTER YOUR TOTAL KNEE REPLACEMENT     VIC MARTIN MD       Instructions to care for your wound at home:   Change the dressing daily.  Inspect your incision at the time of dressing change for increased redness, tenderness, swelling, or drainage along the incision line.  Some bruising or discoloration is usually present, but call my office for any changes in appearance that concern you.  Also call if you develop a fever above 101 degrees.     You may shower directly over the wound beginning 4 days after your surgery, but do not submerge the wound under water until after your post operative visit when the sutures are removed and the wound is completely sealed without drainage.    Activities:  Physical activity may be resumed gradually according to your comfort level. You may bear weight on your operative leg as tolerated with your crutches or walker as instructed by your therapist.  Follow your home exercise program.  Ice your knee after exercising.        Wear your knee immobilizer at night only until your office visit in 2 weeks to maintain full knee extension.   Do not use the immobilizer during the day unless otherwise instructed.      Wear the anti-embolism stockings day and night until seen in the office for your post operative visit. Remove them twice daily for one hour at a time. Keep the compression stockings flat on your leg.  Do not allow them to roll up or crease your skin.  Call if you develop calf pain.     Outpatient Physical Therapy and home exercises:  Outpatient physical therapy visits are required following discharge from the hospital. The referral for these outpatient therapy visits is routinely given to you at the time of your surgical scheduling. You should have already scheduled your therapy sessions in advance.  If you have not done so, please immediately call the therapy site of your choice to schedule the physical therapy regimen that has been prescribed for you.  You may discontinue the crutches or walker per the therapist's recommendation.      Medications:  New medications for you on discharge will include a pain medication, a stool softener while on the narcotic pain medication, and a blood thinner.  Detailed instructions will come with those medications.  You will also receive instructions on when to resume your home medications.     If you routinely take Aspirin 81 mg, hold the Aspirin while taking the formal blood thinner medication. Then take Aspirin 325 mg (1 tablet) daily for 4 weeks.  Then resume your Aspirin 81 mg daily if that is your routine.        Antibiotic coverage will be needed before any type of dental procedure.  This is a life long recommendation.  You should notify your dentist of your total knee surgery and call your dentist or our office one week before a dental appointment for antibiotics.        Clinic follow-up appointment:  Your clinic follow-up appointment has been prearranged.  Call [phone] with any questions.    Oliver Daniels MD     You have been discharged to [facility]  Phone Number is  "[phone]  Fax Number is [phone]    Pending Results     Date and Time Order Name Status Description    2017 0742 XR Chest 2 Views Preliminary             Statement of Approval     Ordered          17 0910  I have reviewed and agree with all the recommendations and orders detailed in this document.  EFFECTIVE NOW     Approved and electronically signed by:  Oliver Daniels MD             Admission Information     Date & Time Provider Department Dept. Phone    2017 Oliver Daniels MD [place] [address] [facility] [phone]      Your Vitals Were     Blood Pressure Pulse Temperature Respirations Height Weight    103/56 (BP Location: Right arm) 72 97.6  F (36.4  C) (Oral) 16 1.549 m (5' 1\") 67.5 kg (148 lb 12.8 oz)    Pulse Oximetry BMI (Body Mass Index)                93% 28.12 kg/m2          [employer] Information     [employer] lets you send messages to your doctor, view your test results, renew your prescriptions, schedule appointments and more. To sign up, go to [email or web address] . Click on \"Log in\" on the left side of the screen, which will take you to the Welcome page. Then click on \"Sign up Now\" on the right side of the page.     You will be asked to enter the access code listed below, as well as some personal information. Please follow the directions to create your username and password.     Your access code is: [email or web address]  Expires: 2017  5:22 PM     Your access code will  in 90 days. If you need help or a new code, please call your [facility] clinic or [phone].        Care EveryWhere ID     This is your Care EveryWhere ID. This could be used by other organizations to access your [facility] medical records  [id number]           Review of your medicines      START taking        Dose / Directions    enoxaparin 40 MG/0.4ML injection   Commonly known as:  LOVENOX        Dose:  40 mg   Inject 0.4 mLs (40 mg) Subcutaneous every 24 hours for 10 " days   Refills:  0       ferrous sulfate 325 (65 FE) MG tablet   Commonly known as:  IRON   Used for:  Anemia due to blood loss, acute   Notes to Patient:  Not given in hospital. Resume home schedule.        Dose:  325 mg   Take 1 tablet (325 mg) by mouth 2 times daily   Quantity:  30 tablet   Refills:  2       folic acid 1 MG tablet   Commonly known as:  FOLVITE   Used for:  Anemia due to blood loss, acute   Notes to Patient:  Not given in hospital. Resume home schedule.        Dose:  1 mg   Take 1 tablet (1 mg) by mouth daily   Quantity:  100 tablet   Refills:  3       HYDROcodone-acetaminophen 5-325 MG per tablet   Commonly known as:  NORCO        Dose:  1-2 tablet   Take 1-2 tablets by mouth every 4 hours as needed for moderate to severe pain   Quantity:  40 tablet   Refills:  0       senna-docusate 8.6-50 MG per tablet   Commonly known as:  SENOKOT-S;PERICOLACE        Dose:  1-2 tablet   Take 1-2 tablets by mouth 2 times daily   Quantity:  50 tablet   Refills:  0         CONTINUE these medicines which may have CHANGED, or have new prescriptions. If we are uncertain of the size of tablets/capsules you have at home, strength may be listed as something that might have changed.        Dose / Directions    * Methylphenidate HCl ER 18 MG Tb24   This may have changed:  Another medication with the same name was changed. Make sure you understand how and when to take each.   Used for:  Attention deficit disorder        Dose:  18 mg   Take 1 tablet (18 mg) by mouth every morning   Quantity:  15 tablet   Refills:  0       * methylphenidate 10 MG tablet   Commonly known as:  RITALIN   This may have changed:  medication strength   Used for:  Attention deficit disorder        Dose:  10 mg   Take 1 tablet (10 mg) by mouth every 24 hours (in the late afternoon between 14:00-15:00)   Quantity:  14 tablet   Refills:  0       * Notice:  This list has 2 medication(s) that are the same as other medications prescribed for you. Read  the directions carefully, and ask your doctor or other care provider to review them with you.      CONTINUE these medicines which have NOT CHANGED        Dose / Directions    albuterol 108 (90 BASE) MCG/ACT Inhaler   Commonly known as:  PROAIR HFA/PROVENTIL HFA/VENTOLIN HFA        Dose:  2 puff   Inhale 2 puffs into the lungs every 6 hours as needed for shortness of breath / dyspnea or wheezing   Refills:  0       budesonide-formoterol 80-4.5 MCG/ACT Inhaler   Commonly known as:  SYMBICORT        Dose:  2 puff   Inhale 2 puffs into the lungs 2 times daily   Refills:  0       desonide 0.05 % ointment   Commonly known as:  DESOWEN        Apply topically 2 times daily as needed   Refills:  0       LEVOTHYROXINE SODIUM PO        Dose:  50 mcg   Take 50 mcg by mouth every morning   Refills:  0       losartan-hydrochlorothiazide 100-25 MG per tablet   Commonly known as:  HYZAAR   Notes to Patient:  Not given in hospital. Resume home schedule.        Dose:  1 tablet   Take 1 tablet by mouth every morning   Refills:  0       MINOCYCLINE HCL PO        Dose:  50 mg   Take 50 mg by mouth daily as needed (rash)   Refills:  0       polyethylene glycol 0.4%- propylene glycol 0.3% 0.4-0.3 % Soln ophthalmic solution   Commonly known as:  SYSTANE ULTRA        Dose:  1 drop   Place 1 drop into both eyes daily   Refills:  0       prenatal multivitamin  plus iron 27-0.8 MG Tabs per tablet   Notes to Patient:  Not given in hospital. Resume home schedule.        Dose:  1 tablet   Take 1 tablet by mouth daily   Refills:  0       PROZAC PO        Dose:  20 mg   Take 20 mg by mouth At Bedtime   Refills:  0       SALINE NASAL SPRAY NA   Notes to Patient:  Not given in hospital. Resume home schedule.        Dose:  1 spray   Spray 1 spray in nostril every morning ([employer] brand)   Refills:  0       TUMS PO        Dose:  2 chew tab   Take 2 chew tab by mouth daily as needed   Refills:  0       TYLENOL PO        Dose:  500 mg   Take 500 mg by  mouth daily as needed   Refills:  0         STOP taking     IBUPROFEN PO                Where to get your medicines      Some of these will need a paper prescription and others can be bought over the counter. Ask your nurse if you have questions.     Bring a paper prescription for each of these medications     methylphenidate 10 MG tablet    Methylphenidate HCl ER 18 MG Tb24       You don't need a prescription for these medications     enoxaparin 40 MG/0.4ML injection    ferrous sulfate 325 (65 FE) MG tablet    folic acid 1 MG tablet    senna-docusate 8.6-50 MG per tablet         Information about where to get these medications is not yet available     ! Ask your nurse or doctor about these medications     HYDROcodone-acetaminophen 5-325 MG per tablet                Protect others around you: Learn how to safely use, store and throw away your medicines at www.disposemymeds.org.             Medication List: This is a list of all your medications and when to take them. Check marks below indicate your daily home schedule. Keep this list as a reference.      Medications           Morning Afternoon Evening Bedtime As Needed    albuterol 108 (90 BASE) MCG/ACT Inhaler   Commonly known as:  PROAIR HFA/PROVENTIL HFA/VENTOLIN HFA   Inhale 2 puffs into the lungs every 6 hours as needed for shortness of breath / dyspnea or wheezing   Last time this was given:  2 puffs on 5/23/2017  8:21 PM                                   budesonide-formoterol 80-4.5 MCG/ACT Inhaler   Commonly known as:  SYMBICORT   Inhale 2 puffs into the lungs 2 times daily   Last time this was given:  2 puffs on 5/26/2017  8:24 AM                                      desonide 0.05 % ointment   Commonly known as:  DESOWEN   Apply topically 2 times daily as needed                                   enoxaparin 40 MG/0.4ML injection   Commonly known as:  LOVENOX   Inject 0.4 mLs (40 mg) Subcutaneous every 24 hours for 10 days   Last time this was given:  40 mg  on 5/26/2017 10:38 AM   Next Dose Due:  Tomorrow, 5/26 at or around 10 am                                   ferrous sulfate 325 (65 FE) MG tablet   Commonly known as:  IRON   Take 1 tablet (325 mg) by mouth 2 times daily   Notes to Patient:  Not given in hospital. Resume home schedule.                                folic acid 1 MG tablet   Commonly known as:  FOLVITE   Take 1 tablet (1 mg) by mouth daily   Notes to Patient:  Not given in hospital. Resume home schedule.                                HYDROcodone-acetaminophen 5-325 MG per tablet   Commonly known as:  NORCO   Take 1-2 tablets by mouth every 4 hours as needed for moderate to severe pain   Last time this was given:  2 tablets on 5/26/2017  1:56 PM                                   LEVOTHYROXINE SODIUM PO   Take 50 mcg by mouth every morning   Last time this was given:  50 mcg on 5/26/2017  6:36 AM   Next Dose Due:  Tomorrow, 5/26 in the morning                                   losartan-hydrochlorothiazide 100-25 MG per tablet   Commonly known as:  HYZAAR   Take 1 tablet by mouth every morning   Notes to Patient:  Not given in hospital. Resume home schedule.                                * Methylphenidate HCl ER 18 MG Tb24   Take 1 tablet (18 mg) by mouth every morning                                * methylphenidate 10 MG tablet   Commonly known as:  RITALIN   Take 1 tablet (10 mg) by mouth every 24 hours (in the late afternoon between 14:00-15:00)                                MINOCYCLINE HCL PO   Take 50 mg by mouth daily as needed (rash)                                   polyethylene glycol 0.4%- propylene glycol 0.3% 0.4-0.3 % Soln ophthalmic solution   Commonly known as:  SYSTANE ULTRA   Place 1 drop into both eyes daily   Last time this was given:  1 drop on 5/26/2017  8:24 AM   Next Dose Due:  Tomorrow, 5/26 in the morning                                   prenatal multivitamin  plus iron 27-0.8 MG Tabs per tablet   Take 1 tablet by mouth  daily   Notes to Patient:  Not given in hospital. Resume home schedule.                                PROZAC PO   Take 20 mg by mouth At Bedtime   Last time this was given:  20 mg on 5/25/2017  9:47 PM   Next Dose Due:  Today, 5/26 at bedtime                                   SALINE NASAL SPRAY NA   Spray 1 spray in nostril every morning ([employer] brand)   Notes to Patient:  Not given in hospital. Resume home schedule.                                senna-docusate 8.6-50 MG per tablet   Commonly known as:  SENOKOT-S;PERICOLACE   Take 1-2 tablets by mouth 2 times daily   Last time this was given:  2 tablets on 5/26/2017  8:22 AM   Next Dose Due:  Today, 5/25 at bedtime                                      TUMS PO   Take 2 chew tab by mouth daily as needed                                   TYLENOL PO   Take 500 mg by mouth daily as needed   Last time this was given:  975 mg on 5/24/2017  6:31 AM                                   * Notice:  This list has 2 medication(s) that are the same as other medications prescribed for you. Read the directions carefully, and ask your doctor or other care provider to review them with you.

## 2017-05-22 NOTE — PROGRESS NOTES
05/22/17 1705   Quick Adds   Type of Visit Initial PT Evaluation   Living Environment   Lives With spouse   Living Arrangements house  (Townhouse)   Number of Stairs to Enter Home 3  (1 railing. )   Number of Stairs Within Home 11  (B railings.)   Transportation Available car;family or friend will provide  (Pt drove prior to hospitalization. )   Living Environment Comment Pt's spouse is able to assist at time of discharge. 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    Self-Care   Usual Activity Tolerance good  (Limited by pain. )   Current Activity Tolerance moderate   Regular Exercise yes   Activity/Exercise Type other (see comments)  (Pilates)   Equipment Currently Used at Home none   Functional Level Prior   Ambulation 0-->independent   Transferring 0-->independent   Fall history within last six months yes   Number of times patient has fallen within last six months (Episodes of fainting. )   Prior Functional Level Comment Pt completed functional mobility independently without use of an AD at baseline.    General Information   Onset of Illness/Injury or Date of Surgery - Date 05/22/17   Referring Physician Oliver Daniels MD   Patient/Family Goals Statement None stated.    Pertinent History of Current Problem (include personal factors and/or comorbidities that impact the POC) 70 y/o female POD # 0 L TKA.    Precautions/Limitations fall precautions  (KI on at night. )   Weight-Bearing Status - LLE weight-bearing as tolerated   General Observations Pt in supine upon arrival of therapist.    General Info Comments Ambulate with assist.    Cognitive Status Examination   Orientation orientation to person, place and time   Level of Consciousness alert   Follows Commands and Answers Questions 100% of the time   Personal Safety and Judgment intact   Pain Assessment   Patient Currently in Pain (L knee pain at rest: 3-4/10 )   Integumentary/Edema   Integumentary/Edema Comments L knee incision covered  "with dressing, hemovac intact.    Posture    Posture Comments No deficits noted.    Range of Motion (ROM)   ROM Comment L knee ROM: 14-88 degrees otherwise B LEs WFL.    Strength   Strength Comments Not formally assessed, pt demonstrated at least 3.5 grossly in B LEs with functional mobility.    Bed Mobility   Bed Mobility Comments Supine <> sit, SBA.    Transfer Skills   Transfer Comments Sit <> stand with FWW and CGA.    Gait   Gait Comments Pt amb 10' with FWW and CGA, noted on L knee buckling with KI donned.    Balance   Balance Comments Noted good sitting and standing balance at FWW.    Sensory Examination   Sensory Perception Comments Pt denied numbness/tingling in B LEs.    Modality Interventions   Planned Modality Interventions Cryotherapy   Planned Modality Interventions Comments PRN.    General Therapy Interventions   Planned Therapy Interventions bed mobility training;gait training;ROM;strengthening;transfer training   Clinical Impression   Criteria for Skilled Therapeutic Intervention yes, treatment indicated   PT Diagnosis Difficulty with gait.    Influenced by the following impairments Pain, Impaired L knee ROM, Decreased strength, decreased activity tolerance   Functional limitations due to impairments Limited functional mobility requiring AD and assist.    Clinical Presentation Stable/Uncomplicated   Clinical Presentation Rationale Based on PMH, current presentation and social support.    Clinical Decision Making (Complexity) Low complexity   Therapy Frequency` 2 times/day   Predicted Duration of Therapy Intervention (days/wks) 4 days   Anticipated Equipment Needs at Discharge walker  (Defer to TCU. )   Anticipated Discharge Disposition Transitional Care Facility   Risk & Benefits of therapy have been explained Yes   Patient, Family & other staff in agreement with plan of care Yes   [facility] AM-PAC  \"6 Clicks\" V.2 Basic Mobility Inpatient Short Form   1. Turning from your back to your side " while in a flat bed without using bedrails? 4 - None   2. Moving from lying on your back to sitting on the side of a flat bed without using bedrails? 4 - None   3. Moving to and from a bed to a chair (including a wheelchair)? 3 - A Little   4. Standing up from a chair using your arms (e.g., wheelchair, or bedside chair)? 3 - A Little   5. To walk in hospital room? 3 - A Little   6. Climbing 3-5 steps with a railing? 2 - A Lot   Basic Mobility Raw Score (Score out of 24.Lower scores equate to lower levels of function) 19   Total Evaluation Time   Total Evaluation Time (Minutes) 10

## 2017-05-22 NOTE — IP AVS SNAPSHOT
"[place] [address] [facility]: [phone]                                              INTERAGENCY TRANSFER FORM - PHYSICIAN ORDERS   2017                    Hospital Admission Date: 2017  ELSA BYRNE   : 1945  Sex: Female        Attending Provider: Oliver Daniels MD     Allergies:  Penicillins, Atarax [Hydroxyzine], Darvon [Propoxyphene], Demerol [Meperidine]    Infection:  None   Service:  SURGERY    Ht:  1.549 m (5' 1\")   Wt:  67.5 kg (148 lb 12.8 oz)   Admission Wt:  60.3 kg (133 lb)    BMI:  28.12 kg/m 2   BSA:  1.7 m 2            Patient PCP Information     Provider PCP Type    Dutch Sterling MD General      ED Clinical Impression     Diagnosis Description Comment Added By Time Added    Status post total left knee replacement [Z96.652] Status post total left knee replacement [Z96.652]  Oliver Daniels MD 2017  9:04 AM    Anemia due to blood loss, acute [D62] Anemia due to blood loss, acute [D62]  Jonathan Kendall MD 2017  7:50 AM    Attention deficit disorder [F98.8] Attention deficit disorder [F98.8]  Jonathan Kendall MD 2017  1:28 PM      Hospital Problems as of 2017              Priority Class Noted POA    * (Principal)S/P total knee arthroplasty Medium  2017 Yes      Non-Hospital Problems as of 2017     None      Code Status History     Date Active Date Inactive Code Status Order ID Comments User Context    This patient has a current code status but no historical code status.         Medication Review      START taking        Dose / Directions Comments    enoxaparin 40 MG/0.4ML injection   Commonly known as:  LOVENOX        Dose:  40 mg   Inject 0.4 mLs (40 mg) Subcutaneous every 24 hours for 10 days   Refills:  0        ferrous sulfate 325 (65 FE) MG tablet   Commonly known as:  IRON   Used for:  Anemia due to blood loss, acute   Notes to Patient:  Not given in hospital. Resume home schedule.        Dose:  325 mg "   Take 1 tablet (325 mg) by mouth 2 times daily   Quantity:  30 tablet   Refills:  2        folic acid 1 MG tablet   Commonly known as:  FOLVITE   Used for:  Anemia due to blood loss, acute   Notes to Patient:  Not given in hospital. Resume home schedule.        Dose:  1 mg   Take 1 tablet (1 mg) by mouth daily   Quantity:  100 tablet   Refills:  3        HYDROcodone-acetaminophen 5-325 MG per tablet   Commonly known as:  NORCO        Dose:  1-2 tablet   Take 1-2 tablets by mouth every 4 hours as needed for moderate to severe pain   Quantity:  40 tablet   Refills:  0        senna-docusate 8.6-50 MG per tablet   Commonly known as:  SENOKOT-S;PERICOLACE        Dose:  1-2 tablet   Take 1-2 tablets by mouth 2 times daily   Quantity:  50 tablet   Refills:  0          CONTINUE these medications which may have CHANGED, or have new prescriptions. If we are uncertain of the size of tablets/capsules you have at home, strength may be listed as something that might have changed.        Dose / Directions Comments    * Methylphenidate HCl ER 18 MG Tb24   This may have changed:  Another medication with the same name was changed. Make sure you understand how and when to take each.   Used for:  Attention deficit disorder        Dose:  18 mg   Take 1 tablet (18 mg) by mouth every morning   Quantity:  15 tablet   Refills:  0        * methylphenidate 10 MG tablet   Commonly known as:  RITALIN   This may have changed:  medication strength   Used for:  Attention deficit disorder        Dose:  10 mg   Take 1 tablet (10 mg) by mouth every 24 hours (in the late afternoon between 14:00-15:00)   Quantity:  14 tablet   Refills:  0        * Notice:  This list has 2 medication(s) that are the same as other medications prescribed for you. Read the directions carefully, and ask your doctor or other care provider to review them with you.      CONTINUE these medications which have NOT CHANGED        Dose / Directions Comments    albuterol 108 (90  BASE) MCG/ACT Inhaler   Commonly known as:  PROAIR HFA/PROVENTIL HFA/VENTOLIN HFA        Dose:  2 puff   Inhale 2 puffs into the lungs every 6 hours as needed for shortness of breath / dyspnea or wheezing   Refills:  0        budesonide-formoterol 80-4.5 MCG/ACT Inhaler   Commonly known as:  SYMBICORT        Dose:  2 puff   Inhale 2 puffs into the lungs 2 times daily   Refills:  0        desonide 0.05 % ointment   Commonly known as:  DESOWEN        Apply topically 2 times daily as needed   Refills:  0        LEVOTHYROXINE SODIUM PO        Dose:  50 mcg   Take 50 mcg by mouth every morning   Refills:  0        losartan-hydrochlorothiazide 100-25 MG per tablet   Commonly known as:  HYZAAR   Notes to Patient:  Not given in hospital. Resume home schedule.        Dose:  1 tablet   Take 1 tablet by mouth every morning   Refills:  0        MINOCYCLINE HCL PO        Dose:  50 mg   Take 50 mg by mouth daily as needed (rash)   Refills:  0        polyethylene glycol 0.4%- propylene glycol 0.3% 0.4-0.3 % Soln ophthalmic solution   Commonly known as:  SYSTANE ULTRA        Dose:  1 drop   Place 1 drop into both eyes daily   Refills:  0        prenatal multivitamin  plus iron 27-0.8 MG Tabs per tablet   Notes to Patient:  Not given in hospital. Resume home schedule.        Dose:  1 tablet   Take 1 tablet by mouth daily   Refills:  0        PROZAC PO        Dose:  20 mg   Take 20 mg by mouth At Bedtime   Refills:  0        SALINE NASAL SPRAY NA   Notes to Patient:  Not given in hospital. Resume home schedule.        Dose:  1 spray   Spray 1 spray in nostril every morning ([employer] brand)   Refills:  0        TUMS PO        Dose:  2 chew tab   Take 2 chew tab by mouth daily as needed   Refills:  0        TYLENOL PO        Dose:  500 mg   Take 500 mg by mouth daily as needed   Refills:  0          STOP taking     IBUPROFEN PO                     Further instructions from your care team       DISCHARGE INSTRUCTIONS AFTER YOUR TOTAL  KNEE REPLACEMENT     VIC MARTIN MD       Instructions to care for your wound at home:   Change the dressing daily.  Inspect your incision at the time of dressing change for increased redness, tenderness, swelling, or drainage along the incision line.  Some bruising or discoloration is usually present, but call my office for any changes in appearance that concern you.  Also call if you develop a fever above 101 degrees.     You may shower directly over the wound beginning 4 days after your surgery, but do not submerge the wound under water until after your post operative visit when the sutures are removed and the wound is completely sealed without drainage.    Activities:  Physical activity may be resumed gradually according to your comfort level. You may bear weight on your operative leg as tolerated with your crutches or walker as instructed by your therapist.  Follow your home exercise program.  Ice your knee after exercising.        Wear your knee immobilizer at night only until your office visit in 2 weeks to maintain full knee extension.  Do not use the immobilizer during the day unless otherwise instructed.      Wear the anti-embolism stockings day and night until seen in the office for your post operative visit. Remove them twice daily for one hour at a time. Keep the compression stockings flat on your leg.  Do not allow them to roll up or crease your skin.  Call if you develop calf pain.     Outpatient Physical Therapy and home exercises:  Outpatient physical therapy visits are required following discharge from the hospital. The referral for these outpatient therapy visits is routinely given to you at the time of your surgical scheduling. You should have already scheduled your therapy sessions in advance.  If you have not done so, please immediately call the therapy site of your choice to schedule the physical therapy regimen that has been prescribed for you.  You may discontinue the crutches or walker  per the therapist's recommendation.      Medications:  New medications for you on discharge will include a pain medication, a stool softener while on the narcotic pain medication, and a blood thinner.  Detailed instructions will come with those medications.  You will also receive instructions on when to resume your home medications.     If you routinely take Aspirin 81 mg, hold the Aspirin while taking the formal blood thinner medication. Then take Aspirin 325 mg (1 tablet) daily for 4 weeks.  Then resume your Aspirin 81 mg daily if that is your routine.        Antibiotic coverage will be needed before any type of dental procedure.  This is a life long recommendation.  You should notify your dentist of your total knee surgery and call your dentist or our office one week before a dental appointment for antibiotics.        Clinic follow-up appointment:  Your clinic follow-up appointment has been prearranged.  Call [phone] with any questions.    Oliver Daniels MD     You have been [facility] to [facility]  Phone Number is [phone]  Fax Number is [phone]    After Care     Activity - Up ad charley           Advance Diet as Tolerated       Follow this diet upon discharge: Advance to a regular diet as tolerated       Daily weights       Call Provider for weight gain of more than 2 pounds per day or 5 pounds per week.       General info for SNF       Length of Stay Estimate: Short Term Care: Estimated # of Days 5-7  Condition at Discharge: Improving  Level of care:skilled   Rehabilitation Potential: Excellent  Admission H&P remains valid and up-to-date: Yes  Recent Chemotherapy: N/A  Use Nursing Home Standing Orders: N/A       Mantoux instructions       Give two-step Mantoux (PPD) Per Facility Policy Yes       Wound care (specify)       Site:   Left knee  Instructions:  Daily dressing changes             Procedures     Oxygen - Nasal cannula       2-4 Lpm by nasal cannula to keep O2 sats 90% or greater,  wean as able.             Referrals     Physical Therapy Adult Consult       Evaluate and treat as clinically indicated.    Reason: left TKA             Follow-Up Appointment Instructions     Future Labs/Procedures    Follow Up and recommended labs and tests     Comments:    Follow up with senior care physician.  The following labs/tests are recommended: H&H in 3-5 days.      Follow-Up Appointment Instructions     Follow Up and recommended labs and tests       Follow up with senior care physician.  The following labs/tests are recommended: H&H in 3-5 days.             Statement of Approval     Ordered          05/24/17 0910  I have reviewed and agree with all the recommendations and orders detailed in this document.  EFFECTIVE NOW     Approved and electronically signed by:  Oliver Daniels MD

## 2017-05-22 NOTE — IP AVS SNAPSHOT
"          [place] [address] [facility]: [phone]                                              INTERAGENCY TRANSFER FORM - LAB / IMAGING / EKG / EMG RESULTS   2017                    Hospital Admission Date: 2017  ELSA BYRNE   : 1945  Sex: Female        Attending Provider: Oliver Daniels MD     Allergies:  Penicillins, Atarax [Hydroxyzine], Darvon [Propoxyphene], Demerol [Meperidine]    Infection:  None   Service:  SURGERY    Ht:  1.549 m (5' 1\")   Wt:  60.3 kg (133 lb)   Admission Wt:  60.3 kg (133 lb)    BMI:  25.13 kg/m 2   BSA:  1.61 m 2            Patient PCP Information     Provider PCP Type    Dutch Sterling MD General         Lab Results - 3 Days      Iron and iron binding capacity [[id number]] (Abnormal)  Resulted: 17 1029, Result status: Final result    Ordering provider: Jonathan Kendall MD  17 0801 Resulting lab: [facility]    Specimen Information    Type Source Collected On   Blood  17 0935          Components       Value Reference Range Flag Lab   Iron 19 35 - 180 ug/dL L FrStHsLb   Iron Binding Cap 218 240 - 430 ug/dL L FrStHsLb   Iron Saturation Index 9 15 - 46 % L FrStHsLb            Creatinine [[id number]]  Resulted: 17 1025, Result status: Final result    Ordering provider: Jonathan Kendall MD  17 0748 Resulting lab: [facility]    Specimen Information    Type Source Collected On   Blood  17 0935          Components       Value Reference Range Flag Lab   Creatinine 0.53 0.52 - 1.04 mg/dL  FrStHsLb   GFR Estimate -- >60 mL/min/1.7m2  FrStHsLb   Result:         >90  Non  GFR Calc     GFR Estimate If Black -- >60 mL/min/1.7m2  FrStHsLb   Result:         >90   GFR Calc              Potassium [[id number]]  Resulted: 17 1025, Result status: Final result    Ordering provider: Jonathan Kendall MD  17 0748 Resulting lab: [facility]    Specimen Information    Type Source Collected On   Blood  05/24/17 0935          Components       Value Reference Range Flag Lab   Potassium 3.6 3.4 - 5.3 mmol/L  FrStHsLb            Hemoglobin [[id number]] (Abnormal)  Resulted: 05/24/17 0701, Result status: Final result    Ordering provider: Oliver Daniels MD  05/24/17 0000 Resulting lab: [facility]    Specimen Information    Type Source Collected On   Blood  05/24/17 0632          Components       Value Reference Range Flag Lab   Hemoglobin 9.4 11.7 - 15.7 g/dL L FrStHsLb            Basic metabolic panel [[id number]] (Abnormal)  Resulted: 05/23/17 0648, Result status: Final result    Ordering provider: Oliver Daniels MD  05/23/17 0001 Resulting lab: [facility]    Specimen Information    Type Source Collected On   Blood  05/23/17 0615          Components       Value Reference Range Flag Lab   Sodium 137 133 - 144 mmol/L  FrStHsLb   Potassium 4.1 3.4 - 5.3 mmol/L  FrStHsLb   Chloride 104 94 - 109 mmol/L  FrStHsLb   Carbon Dioxide 25 20 - 32 mmol/L  FrStHsLb   Anion Gap 8 3 - 14 mmol/L  FrStHsLb   Glucose 108 70 - 99 mg/dL H FrStHsLb   Urea Nitrogen 16 7 - 30 mg/dL  FrStHsLb   Creatinine 0.69 0.52 - 1.04 mg/dL  FrStHsLb   GFR Estimate 83 >60 mL/min/1.7m2  FrStHsLb   Comment:  Non  GFR Calc   GFR Estimate If Black -- >60 mL/min/1.7m2  FrStHsLb   Result:         >90   GFR Calc     Calcium 8.0 8.5 - 10.1 mg/dL L FrStHsLb   Result:              Hemoglobin [[id number]] (Abnormal)  Resulted: 05/23/17 0641, Result status: Final result    Ordering provider: Oliver Daniels MD  05/23/17 0001 Resulting lab: [facility]    Specimen Information    Type Source Collected On   Blood  05/23/17 0615          Components       Value Reference Range Flag Lab   Hemoglobin 10.2 11.7 - 15.7 g/dL L FrStHsLb            Platelet count [[id number]]  Resulted: 05/22/17 1426,  Result status: Final result    Ordering provider: Oliver Daniels MD  05/22/17 0924 Resulting lab: [facility]    Specimen Information    Type Source Collected On     05/22/17 0924          Components       Value Reference Range Flag Lab   Platelet Count 255 150 - 450 10e9/L  FrStHsLb            Basic metabolic panel [[id number]]  Resulted: 05/22/17 0946, Result status: Final result    Ordering provider: Oliver Daniels MD  05/22/17 0849 Resulting lab: [facility]    Specimen Information    Type Source Collected On   Blood  05/22/17 0924          Components       Value Reference Range Flag Lab   Sodium 140 133 - 144 mmol/L  FrStHsLb   Potassium 3.8 3.4 - 5.3 mmol/L  FrStHsLb   Chloride 104 94 - 109 mmol/L  FrStHsLb   Carbon Dioxide 28 20 - 32 mmol/L  FrStHsLb   Anion Gap 8 3 - 14 mmol/L  FrStHsLb   Glucose 97 70 - 99 mg/dL  FrStHsLb   Urea Nitrogen 16 7 - 30 mg/dL  FrStHsLb   Creatinine 0.59 0.52 - 1.04 mg/dL  FrStHsLb   GFR Estimate -- >60 mL/min/1.7m2  FrStHsLb   Result:         >90  Non  GFR Calc     GFR Estimate If Black -- >60 mL/min/1.7m2  FrStHsLb   Result:         >90   GFR Calc     Calcium 9.5 8.5 - 10.1 mg/dL  FrStHsLb   Result:              Hemoglobin [[id number]]  Resulted: 05/22/17 0935, Result status: Final result    Ordering provider: Oliver Daniels MD  05/22/17 0849 Resulting lab: [facility]    Specimen Information    Type Source Collected On   Blood  05/22/17 0924          Components       Value Reference Range Flag Lab   Hemoglobin 12.4 11.7 - 15.7 g/dL  FrStHsLb            Testing Performed By     Lab - Abbreviation Name Director Address Valid Date Range    14 - FrStHsLb [facility] Unknown [address] [place] [address] 05/08/15 1057 - Present            Unresulted Labs (24h ago through future)    Start       Ordered    05/25/17 0600  Platelet count  (enoxaparin (LOVENOX)  (Weight  kg with CrCl greater than 30 mL/min is prechecked))  EVERY THREE DAYS,   Routine     Comments:  Repeat every 3 days while on VTE prophylaxis. If no result is listed, this lab has not been done the past 365 days. LATEST LAB RESULT: No results found for: PLT      05/22/17 1402    05/25/17 0600  Creatinine  EVERY THREE DAYS,   Routine     Comments:  Last Lab Result: Creatinine (mg/dL)       Date                     Value                 05/22/2017               0.59             ----------    05/22/17 1437    05/25/17 0600  Basic metabolic panel  AM DRAW,   Routine      05/24/17 0749    05/25/17 0600  Hemoglobin and hematocrit  AM DRAW,   Routine      05/24/17 0749    Unscheduled  Hemoglobin  CONDITIONAL X 2,   Routine     Comments:  Release on POD 1 and POD 2 if the morning Hgb is less than 8.0    05/22/17 1402         Imaging Results - 3 Days      XR Knee Port Left 1/2 Views [[id number]]  Resulted: 05/22/17 1412, Result status: Final result    Ordering provider: Oliver Daniels MD  05/22/17 1211 Resulted by: Domingo Smith MD    Performed: 05/22/17 1220 - 05/22/17 1230 Resulting lab: RADIOLOGY RESULTS    Narrative:       XR KNEE PORT LT 1/2 VW  5/22/2017 12:30 PM     HISTORY:  Post-Op Total Knee    COMPARISON: None.    FINDINGS:  2 views. Left knee arthroplasty has been performed.  Components are in good position. No postoperative complications.    LUDMILA SMITH MD      Testing Performed By     Lab - Abbreviation Name Director Address Valid Date Range    104 - Rad Rslts RADIOLOGY RESULTS Unknown Unknown 02/16/05 1553 - Present                Encounter-Level Documents:     There are no encounter-level documents.      Order-Level Documents - 05/22/2017:            Scan on 5/18/2017  9:33 AM by Outside, Provider : EKG- BETTY (below)

## 2017-05-22 NOTE — ANESTHESIA CARE TRANSFER NOTE
Patient: Jil Conde    Procedure(s):  LEFT TOTAL KNEE ARTHROPLASTY ([employer]PHEW)^ - Wound Class: I-Clean    Diagnosis: djd  Diagnosis Additional Information: No value filed.    Anesthesia Type:   Spinal     Note:  Airway :Room Air  Patient transferred to:PACU  Comments: Pt awake and able to verbalize needs. Spontaneous respiration without difficulty.  All monitors on and audible. Report given to PACU RN, Vital Signs Stable. Pt denies pain.      Vitals: (Last set prior to Anesthesia Care Transfer)    CRNA VITALS  5/22/2017 1132 - 5/22/2017 1210      5/22/2017             Pulse: 73    SpO2: 99 %    Resp Rate (set): 10                Electronically Signed By: KENDRICK Gutierrez CRNA  May 22, 2017  12:10 PM

## 2017-05-22 NOTE — CONSULTS
PRIMARY CARE PHYSICIAN:  Dr. Dutch Sterling.        CONSULTING PROVIDER:  Dr. Daniels.      REASON FOR CONSULTATION:  Hospitalist consult.      HISTORY OF PRESENT ILLNESS:  Jil Conde is a 71-year-old female with past medical history significant for hypertension, moderate persistent asthma, hypothyroidism who is status post left total knee arthroplasty.  The patient was under spinal anesthesia with an EBL of 10 mL.  The patient tolerated the procedure well without complications.  Hospitalist Service was contacted for postoperative medical comanagement.      I met with the patient postoperatively.  The patient is currently resting comfortably in bed with  present at bedside.  The patient notes her pain is currently well controlled.  Currently, denying any chest pain, shortness of breath, abdominal pain, nausea, urinary symptoms or changes in bowel habits.  She does note that she has ongoing issues with constipation.  She has also noticed an increase in her acid reflux over the past several days.  She does have a history of orthostatic hypotension and wishes that staff be aware of this.        REVIEW OF SYSTEMS:  Complete review of systems was performed and is negative other than as noted in HPI.      PAST MEDICAL HISTORY:   1.  Hypertension.   2.  Moderate persistent asthma.   3.  Tobacco abuse.   4.  Arthritis.   5.  Hypothyroidism.   6.  Depression.   7.  Rosacea.    8.  ADD.     9.  Osteopenia.      PRIOR TO ADMISSION MEDICATIONS:   1.  Acetaminophen p.r.n.   2.  Albuterol p.r.n.   3.  Symbicort 2 puffs b.i.d.   4.  Calcium carbonate.   5.  Desonide ointment.   6.  Fluoxetine 20 mg by mouth at bedtime.   7.  Lisinopril p.r.n.   8.  Levothyroxine 50 mcg by mouth every morning.   9.  Losartan/hydrochlorothiazide 100/25 mg daily.   10.  Methylphenidate 18 mg by mouth every morning.   11.  Methylphenidate 10 mg by mouth every afternoon.   12.  Minocycline 50 mg by mouth daily as needed.   13.   Polyethylene glycol.   14.  Prenatal vitamin.   15.  Nasal spray.      ALLERGIES:   1.  Penicillins.   2.  Cataracts.   3.  Darvon.   4.  Demerol.      PAST SURGICAL HISTORY:   1.  Jaw lift.     2.  Total abdominal hysterectomy and unilateral salpingo-oophorectomy.   3.  Tonsillectomy and adenoidectomy.   4.  Liposuction.   5.  Bilateral cataract extraction.      FAMILY HISTORY:  The patient's father has a history of lung cancer.      SOCIAL HISTORY:  The patient denies tobacco abuse or alcohol use.  Denies illicit drug use.      PHYSICAL EXAMINATION:   VITAL SIGNS:  Temperature is 97.6, heart rate 61, blood pressure 111/59, respiratory rate 18, oxygen saturations 99% on 2 liters nasal cannula.   GENERAL:  Well-appearing elderly female.   HEENT:  Pupils equal and reactive to light.  Mucous membranes moist.   NECK:  No thyromegaly or lymphadenopathy.   CARDIOVASCULAR:  Regular rate and rhythm.  No murmurs.   RESPIRATORY:  Lungs clear to auscultation bilaterally.  No increased work of breathing.   ABDOMEN:  Soft, nontender, nondistended.  Normoactive bowel sounds.   EXTREMITIES:  Distal pulses intact in right lower extremity.  Unable to assess left due to Ace bandaging.   NEUROLOGIC:  Oriented x3.   PSYCHIATRIC:  Calm, cooperative.      LABORATORY DATA:  Reviewed in Epic.        ASSESSMENT AND PLAN: Jil Conde is a 71-year-old female with past medical history significant for hypertension, moderate persistent asthma and hypothyroidism who is status post left total knee arthroplasty.     Osteoarthritis, status post left total knee arthroplasty.  The patient underwent procedure on 05/22 with Dr. Daniels.  We will defer postoperative cares including intravenous fluids, deep venous thrombosis prophylaxis, pain control to Orthopedic Surgery.  The patient is currently scheduled to begin Lovenox tomorrow morning for deep venous thrombosis prophylaxis.  PT and OT have been consulted.  Nursing staff to encourage  incentive spirometry.     Hypertension, benign essential.  The patient's blood pressure is currently well controlled.  Prior to admission, patient takes losartan/hydrochlorothiazide, combination tablet.  Will hold prior to admission hydrochlorothiazide portion given the patient is receiving intravenous fluids.  Continue prior to admission losartan with hold parameters in place.  PRN hydralazine is available for systolic blood pressure greater than 180.  Will check a BMP tomorrow morning to ensure that creatinine remains stable.     Moderate persistent asthma prior to admission.  Patient takes Symbicort 2 puffs b.i.d. as well as p.r.n. albuterol which she rarely uses.  Continue prior to admission Symbicort.  It is okay for patient to use home inhaler.  Will have p.r.n. albuterol nebulizers available for shortness of breath or wheezing.  Nursing staff encouraged to wean oxygen as able.     Hypothyroidism.  Continue prior to admission levothyroxine 50 mcg by mouth every morning while patient is hospitalized.     Depression, prior to admission.  The patient is maintained on Prozac prior to admission.  We will continue this while hospitalized.     Attention deficit disorder, hold prior to admission methylphenidate while patient is hospitalized for medication holiday.     Deep venous thrombosis prophylaxis deferred to Orthopedic Surgery.      CODE STATUS:  Full code.      The patient was discussed with Dr. Gonzalez who agrees with the plan as outlined above.         RICHIE GONZALEZ MD       As dictated by KALI ROMERO PA-C            D: 2017 15:06   T: 2017 16:05   MT: DIGNA      Name:     ELSA BYRNE   MRN:      [id number]        Account:       [id number]   :      1945           Consult Date:  2017      Document: [id number]       cc: Dutch Sterling MD

## 2017-05-22 NOTE — OP NOTE
PATIENT NAME:  Jil Conde  MEDICAL RECORD #: [id number]  PATIENT BIRTHDAY:  1945  DATE OF SURGERY: 5/22/2017    SURGEON:    Oliver Daniels MD    1st ASSISTANT:  LAURO Leggett OPA-C    PREOPERATIVE DIAGNOSIS:  Degenerative osteoarthritis left knee.    POSTOPERATIVE DIAGNOSIS:  Degenerative osteoarthritis left knee.    PROCEDURE: left total knee arthroplasty.    COMPONENTS: [employer] - Size 4N CR femoral component, size 2 fully stemmed tibial baseplate with 10 mm CR XLPE high flexion articular insert, and 29 mm patellar component.    ANESTHESIA: Spinal     INDICATIONS FOR PROCEDURE:  The patient was brought to the operating room for elective total knee replacement for advanced degenerative osteoarthritis.  The patient received IV antibiotics preoperatively.  These will be continued for 24 hours.  The patient also will receive anticoagulants  for postoperative thrombosis prophylaxis.  The patient understands the indications, alternatives, risks, benefits, and time involved for recovery and wishes to proceed.  The patient is consented for the procedure.      DESCRIPTION OF PROCEDURE:  The patient was brought to the operating room  and following suitable Spinal anesthesia, the left knee was prepped and draped in the usual manner.  Full timeout was carried out and the patient and proper extremity and operative site identified and confirmed by all members of the operative team.    We next exsanguinated the operative leg with a Gagan bandage and the tourniquet was elevated to 350 mmHg on the thigh.    A 10 cm longitudinal incision was made anteriorly for the MIS technique.  Sharp dissection was carried down through the subcutaneous tissue.  A median parapatellar arthrotomy was carried out and the knee flexed to 90 degrees.    There was severe wear in the medial compartment and moderate wear in the patellofemoral  compartment and moderate wear in the lateral compartment.    The [employer]  instrumentation was used.  The femur was cut for a size 4N CR  implant.  The tibia was cut for a size 2 fully stemmed base plate.  The patella was cut for a 29 mm patellar button.    The trial components were removed from the knee.  The bone surfaces were prepared with jet lavage and careful drying technique.     The posterior capsule was injected for postoperative relief with 30 cc of saline, 30 cc of 0.2% Ropivacaine, and 15 mg of Toradol.       We then cemented the components with HD Simplex cement beginning with the tibial baseplate, followed by the femoral component, followed by the patellar component.    The knee was held in extension with the trial insert in place in the tibia until the cement was set.  Once the cement was set up, the trial component was removed.  We selected the 11 mm CR XLPE high flexion articular insert.  This insert was secured and the knee checked one final time for motion, stability, alignment and soft tissue balance, all of which were excellent.    The wound was irrigated throughout with antibiotic solution using jet lavage.  The tourniquet was deflated prior to wound closure and all bleeding controlled with electrocautery.  We then re-exsanguinated the leg and reinflated the tourniquet during wound closure.    The wound was closed over a medium Hemovac drain with spaced 0 Ethibond interrupted and V-Loc running suture in the parapatellar arthrotomy, 2-0 undyed Vicryl in subcutaneous tissue and skin staples in the skin.  A sterile soft dressing was applied.  The tourniquet deflated one final time.  The patient was taken to the PAR in satisfactory condition.  There were no known complications during the procedure.    A surgical assistant was medically necessary for this procedure for pre-op positioning as well as intra-op retraction and extremity support and positioning.  He was present for the entire procedure.      VIC MARTIN MD    CC  Vic Martin MD          [phone]  Fax
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
## 2017-05-22 NOTE — IP AVS SNAPSHOT
` [place] [address] [facility]: [phone]            Medication Administration Report for Jil Conde as of 05/26/17 1416   Legend:    Given Hold Not Given Due Canceled Entry Other Actions    Time Time (Time) Time  Time-Action       Inactive    Active    Linked        Medications 05/20/17 05/21/17 05/22/17 05/23/17 05/24/17 05/25/17 05/26/17    0.9% sodium chloride BOLUS  Dose: 500 mL Freq: ONCE Route: IV  Last Dose: 500 mL (05/23/17 1044)  Start: 05/23/17 1045       1044 (500 mL)-Rate/Dose Change [C]                     acetaminophen (TYLENOL) tablet 650 mg  Dose: 650 mg Freq: EVERY 4 HOURS PRN Route: PO  PRN Reason: other  PRN Comment: surgical pain  Start: 05/25/17 0000   Admin Instructions: May give first dose 4 hours after last scheduled dose of acetaminophen.  Maximum acetaminophen dose from all sources = 75 mg/kg/day not to exceed 4 grams/day.               albuterol (PROAIR HFA/PROVENTIL HFA/VENTOLIN HFA) Inhaler 2 puff  Dose: 2 puff Freq: EVERY 6 HOURS PRN Route: IN  PRN Reasons: shortness of breath / dyspnea,wheezing  Start: 05/22/17 1402   Admin Instructions: USE PATIENT'S OWN SUPPLY<br>        2021 (2 puff)-Given              albuterol neb solution 2.5 mg  Dose: 2.5 mg Freq: EVERY 2 HOURS PRN Route: NEBULIZATION  PRN Reasons: wheezing,shortness of breath / dyspnea  Start: 05/22/17 1427              budesonide-formoterol (SYMBICORT) 80-4.5 MCG/ACT Inhaler 2 puff  Dose: 2 puff Freq: 2 TIMES DAILY Route: IN  Start: 05/22/17 2100   Admin Instructions: USE PATIENT'S OWN SUPPLY<br>*Do not use more frequently than twice daily.*  Rinse mouth after use.       (2303)-Not Given        0900 (2 puff)-Given [C]       2021 (2 puff)-Given        0639 (2 puff)-Given              2124 (2 puff)-Given        0949 (2 puff)-Given       2149 (2 puff)-Given        0824 (2 puff)-Given       [ ] 2100           calcium carbonate (TUMS) chewable tablet 500 mg  Dose: 500 mg Freq: 3 TIMES DAILY PRN  Route: PO  PRN Reason: heartburn  Start: 05/22/17 1553              cyclobenzaprine (FLEXERIL) tablet 5 mg  Dose: 5 mg Freq: 3 TIMES DAILY PRN Route: PO  PRN Reason: muscle spasms  Start: 05/22/17 1402   Admin Instructions: Caution to be used when administering multiple CNS depressing meds within a short time frame.        1544 (5 mg)-Given        0632 (5 mg)-Given       1407 (5 mg)-Given        0021 (5 mg)-Given       2146 (5 mg)-Given        0636 (5 mg)-Given           diphenhydrAMINE (BENADRYL) solution 12.5 mg  Dose: 12.5 mg Freq: EVERY 6 HOURS PRN Route: PO  PRN Reason: itching  Start: 05/22/17 1402   Admin Instructions: Caution to be used when administering multiple CNS depressing meds within a short time frame.              Or  diphenhydrAMINE (BENADRYL) injection 12.5 mg  Dose: 12.5 mg Freq: EVERY 6 HOURS PRN Route: IV  PRN Reason: itching  PRN Comment: Only give if patient unable to take PO.  Start: 05/22/17 1402   Admin Instructions: Caution to be used when administering multiple CNS depressing meds within a short time frame.               enoxaparin (LOVENOX) injection 40 mg  Dose: 40 mg Freq: EVERY 24 HOURS Route: SC  Start: 05/23/17 1030   Admin Instructions: Check to make sure start date/time is 12-24 hours post op unless documented complication, AND no sooner than 22 hours post op if spinal anesthesia used.   Continue until discharge to home. HOLD if platelet count falls below 50% of baseline or less than 100,000/ L and notify provider.        1115 (40 mg)-Given        1057 (40 mg)-Given        0948 (40 mg)-Given        1038 (40 mg)-Given           FLUoxetine (PROzac) capsule 20 mg  Dose: 20 mg Freq: AT BEDTIME Route: PO  Start: 05/22/17 2200      2110 (20 mg)-Given        (2129)-Not Given [C]        2124 (20 mg)-Given        2147 (20 mg)-Given        [ ] 2200           hydrALAZINE (APRESOLINE) injection 10 mg  Dose: 10 mg Freq: EVERY 4 HOURS PRN Route: IV  PRN Reason: high blood pressure  PRN  "Comment: give for SBP > 180  Start: 05/22/17 1427              hydrochlorothiazide (HYDRODIURIL) tablet 25 mg  Dose: 25 mg Freq: DAILY Route: PO  Start: 05/26/17 0900          0822 (25 mg)-Given           HYDROcodone-acetaminophen (NORCO) 5-325 MG per tablet 1-2 tablet  Dose: 1-2 tablet Freq: EVERY 4 HOURS PRN Route: PO  PRN Reason: moderate to severe pain  Start: 05/23/17 2140   Admin Instructions: Maximum acetaminophen dose from all sources= 75 mg/kg/day not to exceed 4 grams         0445 (1 tablet)-Given [C]       1203 (1 tablet)-Given       1703 (2 tablet)-Given       2123 (2 tablet)-Given        0125 (2 tablet)-Given       0540 (2 tablet)-Given       0946 (2 tablet)-Given       1721 (2 tablet)-Given       2146 (2 tablet)-Given        0443 (2 tablet)-Given       0911 (2 tablet)-Given       1356 (2 tablet)-Given           HYDROmorphone (PF) (DILAUDID) injection 0.3-0.5 mg  Dose: 0.3-0.5 mg Freq: EVERY 2 HOURS PRN Route: IV  PRN Reason: severe pain  PRN Comment: or if patient unable to take PO  Start: 05/22/17 1402   Admin Instructions: Breakthrough pain while on oral analgesics.        1648 (0.5 mg)-Given       1952 (0.5 mg)-Given       2236 (0.5 mg)-Given              levothyroxine (SYNTHROID/LEVOTHROID) tablet 50 mcg  Dose: 50 mcg Freq: EVERY MORNING BEFORE BREAKFAST Route: PO  Start: 05/23/17 0730       0657 (50 mcg)-Given        0632 (50 mcg)-Given        0558 (50 mcg)-Given               0636 (50 mcg)-Given           lidocaine (LMX4) cream  Freq: EVERY 1 HOUR PRN Route: Top  PRN Reason: pain  PRN Comment: with VAD insertion or accessing implanted port.  Start: 05/22/17 1402   Admin Instructions: Do NOT give if patient has a history of allergy to any local anesthetic or any \"tammy\" product.   Apply 30 minutes prior to VAD insertion or port access.  MAX Dose:  2.5 g (  of 5 g tube)               lidocaine 1 % 1 mL  Dose: 1 mL Freq: EVERY 1 HOUR PRN Route: OTHER  PRN Comment: mild pain with VAD insertion or " "accessing implanted port  Start: 05/22/17 1402   Admin Instructions: Do NOT give if patient has a history of allergy to any local anesthetic or any \"tammy\" product. MAX dose 1 mL subcutaneous OR intradermal in divided doses.               lidocaine 1 % 1 mL  Dose: 1 mL Freq: EVERY 1 HOUR PRN Route: OTHER  PRN Comment: mild pain with VAD insertion or accessing implanted port  Start: 05/22/17 0849   Admin Instructions: Do NOT give if patient has a history of allergy to any local anesthetic or any \"tammy\" product. MAX dose 1 mL subcutaneous OR intradermal in divided doses.       1003 (1 mL)-Given               losartan (COZAAR) tablet 100 mg  Dose: 100 mg Freq: DAILY Route: PO  Start: 05/23/17 0900   Admin Instructions: Hold for SBP <110        (0900)-Not Given        0804 (100 mg)-Given        0946 (100 mg)-Given [C]        0822 (100 mg)-Given           metoclopramide (REGLAN) tablet 5 mg  Dose: 5 mg Freq: EVERY 6 HOURS PRN Route: PO  PRN Comment: nausea and vomiting  Start: 05/23/17 2131   Admin Instructions: This is Step 3 of nausea and vomiting management.  Give if nausea not resolved 15 minutes after giving prochlorperazine (COMPAZINE).  If nausea not resolved in 15-30 minutes, Notify provider.                     Or  metoclopramide (REGLAN) injection 5 mg  Dose: 5 mg Freq: EVERY 6 HOURS PRN Route: IV  PRN Comment: nausea and vomiting  Start: 05/23/17 2131   Admin Instructions: This is Step 3 of nausea and vomiting management.  Give if nausea not resolved 15 minutes after giving prochlorperazine (COMPAZINE).  If nausea not resolved in 15-30 minutes, Notify provider.  Irritant.        2236 (5 mg)-Given              naloxone (NARCAN) injection 0.1-0.4 mg  Dose: 0.1-0.4 mg Freq: EVERY 2 MIN PRN Route: IV  PRN Reason: opioid reversal  Start: 05/22/17 1402   Admin Instructions: For respiratory rate LESS than or EQUAL to 8.  Partial reversal dose:  0.1 mg titrated q 2 minutes for Analgesia Side Effects Monitoring " Sedation Level of 3 (frequently drowsy, arousable, drifts to sleep during conversation).Full reversal dose:  0.4 mg bolus for Analgesia Side Effects Monitoring Sedation Level of 4 (somnolent, minimal or no response to stimulation).               ondansetron (ZOFRAN-ODT) ODT tab 4 mg  Dose: 4 mg Freq: EVERY 6 HOURS PRN Route: PO  PRN Reason: nausea  Start: 05/22/17 1402   Admin Instructions: This is Step 1 of nausea and vomiting management.  If nausea not resolved in 15 minutes, go to Step 2 prochlorperazine (COMPAZINE). Do not push through foil backing. Peel back foil and gently remove. Place on tongue immediately. Administration with liquid unnecessary        1548 (4 mg)-Given        1703 (4 mg)-Given            Or  ondansetron (ZOFRAN) injection 4 mg  Dose: 4 mg Freq: EVERY 6 HOURS PRN Route: IV  PRN Reasons: nausea,vomiting  Start: 05/22/17 1402   Admin Instructions: This is Step 1 of nausea and vomiting management.  If nausea not resolved in 15 minutes, go to Step 2 prochlorperazine (COMPAZINE).  Irritant.                             polyethylene glycol (MIRALAX/GLYCOLAX) Packet 17 g  Dose: 17 g Freq: DAILY PRN Route: PO  PRN Comment: constipation  Start: 05/22/17 1554   Admin Instructions: 1 Packet = 17 grams. Mixed prescribed dose in 8 ounces of water. Follow with 8 oz. of water.               polyethylene glycol 0.4%- propylene glycol 0.3% (SYSTANE ULTRA) ophthalmic solution 1 drop  Dose: 1 drop Freq: DAILY Route: Both Eyes  Start: 05/23/17 0900       0953 (1 drop)-Given        0817 (1 drop)-Given        0946 (1 drop)-Given        0824 (1 drop)-Given           potassium chloride (KLOR-CON) Packet 20-40 mEq  Dose: 20-40 mEq Freq: EVERY 2 HOURS PRN Route: ORAL OR FEED  PRN Reason: potassium supplementation  Start: 05/26/17 0740   Admin Instructions: Use if unable to tolerate tablets.  If Serum K+ 3.0-3.3, dose = 60 mEq po total dose (40 mEq x1 followed in 2 hours by 20 mEq x1). Recheck K+ level 4 hours after  dose and the next AM.  If Serum K+ 2.5-2.9, dose = 80 mEq po total dose (40 mEq Q2H x2). Recheck K+ level 4 hours after dose and the next AM.  If Serum K+ less than 2.5, See IV order.  Dissolve packet contents in 4-8 ounces of cold water or juice.               potassium chloride 10 mEq in 100 mL intermittent infusion  Dose: 10 mEq Freq: EVERY 1 HOUR PRN Route: IV  PRN Reason: potassium supplementation  Start: 05/26/17 0740   Admin Instructions: Infuse via PERIPHERAL LINE or CENTRAL LINE. Use for central line replacement if patient weight less than 65 kg, if patient is on TPN with high potassium content or if unit does not stock 20 mEq bags.   If Serum K+ 3.0-3.3, dose = 10 mEq/hr x4 doses (40 mEq IV total dose). Recheck K+ level 2 hours after dose and the next AM.   If Serum K+ less than 3.0, dose = 10 mEq/hr x6 doses (60 mEq IV total dose). Recheck K+ level 2 hours after dose and the next AM.               potassium chloride 10 mEq in 100 mL intermittent infusion with 10 mg lidocaine  Dose: 10 mEq Freq: EVERY 1 HOUR PRN Route: IV  PRN Reason: potassium supplementation  Start: 05/26/17 0740   Admin Instructions: Infuse via PERIPHERAL LINE. Use potassium with lidocaine for pain with peripheral administration.  If Serum K+ 3.0-3.3, dose = 10 mEq/hr x4 doses (40 mEq IV total dose). Recheck K+ level 2 hours after dose and the next AM.  If Serum K+ less than 3.0, dose = 10 mEq/hr x6 doses (60 mEq IV total dose). Recheck K+ level 2 hours after dose and the next AM.               potassium chloride 20 mEq in 50 mL intermittent infusion  Dose: 20 mEq Freq: EVERY 1 HOUR PRN Route: IV  PRN Reason: potassium supplementation  Start: 05/26/17 0740   Admin Instructions: Infuse via CENTRAL LINE Only. May need EKG if less than 65 kg or on TPN - Max rate is 0.3 mEq/kg/hr for patients not on EKG monitoring.   If Serum K+ 3.0-3.3, dose = 20 mEq/hr x2 doses (40 mEq IV total dose). Recheck K+ level 2 hours after dose and the next  AM.  If Serum K+ less than 3.0, dose = 20 mEq/hr x3 doses (60 mEq IV total dose). Recheck K+ level 2 hours after dose and the next AM.               potassium chloride SA (K-DUR/KLOR-CON M) CR tablet 20-40 mEq  Dose: 20-40 mEq Freq: EVERY 2 HOURS PRN Route: PO  PRN Reason: potassium supplementation  Start: 05/26/17 0740   Admin Instructions: Use if able to take PO.   If Serum K+ 3.0-3.3, dose = 60 mEq po total dose (40 mEq x1 followed in 2 hours by 20 mEq x1). Recheck K+ level 4 hours after dose and the next AM.  If Serum K+ 2.5-2.9, dose = 80 mEq po total dose (40 mEq Q2H x2). Recheck K+ level 4 hours after dose and the next AM.  If Serum K+ less than 2.5, See IV order.  DO NOT CRUSH               prochlorperazine (COMPAZINE) injection 5 mg  Dose: 5 mg Freq: EVERY 6 HOURS PRN Route: IV  PRN Reasons: nausea,vomiting  Start: 05/22/17 1402   Admin Instructions: This is Step 2 of nausea and vomiting management.   If nausea not resolved in 15 minutes, give metoclopramide (REGLAN) if ordered (step 3 of nausea and vomiting management)        2023 (5 mg)-Given        0445 (5 mg)-Given [C]            Or  prochlorperazine (COMPAZINE) tablet 5 mg  Dose: 5 mg Freq: EVERY 6 HOURS PRN Route: PO  PRN Reasons: nausea,vomiting  Start: 05/22/17 1402   Admin Instructions: This is Step 2 of nausea and vomiting management.   If nausea not resolved in 15 minutes, give metoclopramide (REGLAN) if ordered (step 3 of nausea and vomiting management)                             senna-docusate (SENOKOT-S;PERICOLACE) 8.6-50 MG per tablet 1-2 tablet  Dose: 1-2 tablet Freq: 2 TIMES DAILY Route: PO  Start: 05/22/17 2100   Admin Instructions: Start with 1 tablet PO BID, If no bowel movement in 24 hours, increase to 2 tablets PO BID.  Hold for loose stools.               0952 (1 tablet)-Given       (2022)-Not Given [C]        0804 (2 tablet)-Given       2124 (2 tablet)-Given        0946 (2 tablet)-Given       2152 (1 tablet)-Given        0822 (2  tablet)-Given       [ ] 2100           sennosides (SENOKOT) tablet 1-2 tablet  Dose: 1-2 tablet Freq: 2 TIMES DAILY PRN Route: PO  PRN Reason: constipation  Start: 05/22/17 1554      (2110)-Not Given       2111 (1 tablet)-Given          2147 (1 tablet)-Given            sodium chloride (PF) 0.9% PF flush 3 mL  Dose: 3 mL Freq: EVERY 8 HOURS Route: IK  Start: 05/22/17 1415   Admin Instructions: And Q1H PRN, to lock peripheral IV dormant line.       (1442)-Not Given               (1400)-Not Given       (2130)-Not Given               (0748)-Not Given       (1646)-Not Given       (2124)-Not Given        0540 (3 mL)-Given       1721 (3 mL)-Given       2147 (3 mL)-Given        0446 (3 mL)-Given              [ ] 1415       [ ] 2215           sodium chloride (PF) 0.9% PF flush 3 mL  Dose: 3 mL Freq: EVERY 1 HOUR PRN Route: IK  PRN Reason: line flush  PRN Comment: for peripheral IV flush post IV meds  Start: 05/22/17 1402       1648 (3 mL)-Given         1149 (3 mL)-Given            zolpidem (AMBIEN) half-tab 2.5 mg  Dose: 2.5 mg Freq: AT BEDTIME PRN Route: PO  PRN Reason: sleep  Start: 05/23/17 2000   Admin Instructions: POD 1.  Do not give unless at least 6 hours of uninterrupted sleep is expected.              Completed Medications  Medications 05/20/17 05/21/17 05/22/17 05/23/17 05/24/17 05/25/17 05/26/17         Dose: 20 mg Freq: ONCE Route: IV  Start: 05/25/17 1130   End: 05/25/17 1148         1148 (20 mg)-Given              Dose: 20 mg Freq: ONCE Route: PO  Start: 05/26/17 0745   End: 05/26/17 0822          0822 (20 mg)-Given          Discontinued Medications  Medications 05/20/17 05/21/17 05/22/17 05/23/17 05/24/17 05/25/17 05/26/17         Rate: 75 mL/hr Freq: CONTINUOUS Route: IV  Start: 05/22/17 1600   End: 05/25/17 1119   Admin Instructions: Change to saline lock when PO well tolerated       1626 ( )-New Bag        0925 ( )-New Bag       1310 ( )-Rate/Dose Verify       2236 ( )-New Bag        0636 ( )-Rate/Dose  Verify       1005 ( )-New Bag        0021 ( )-New Bag       1119-Med Discontinued          Dose: 975 mg Freq: EVERY 8 HOURS Route: PO  Start: 05/22/17 1415   End: 05/25/17 1414   Admin Instructions: Do not use if patient has an active opioid/acetaminophen analgesic order for pain  Maximum acetaminophen dose from all sources = 75 mg/kg/day not to exceed 4 grams/day.       1456 (975 mg)-Given       2110 (975 mg)-Given               0657 (975 mg)-Given       1545 (975 mg)-Given       (2129)-Not Given [C]        0631 (975 mg)-Given       (1407)-Not Given [C]       (2116)-Not Given [C]        (0534)-Not Given [C]       1414-Med Discontinued          Dose: 5-10 mg Freq: EVERY 4 HOURS PRN Route: PO  PRN Reason: moderate to severe pain  Start: 05/22/17 1402   End: 05/23/17 2144   Admin Instructions: Hold while on PCA or with regular IV opioid dosing.  IF CrCl UNKNOWN start at lowest end of dosing range.       1756 (5 mg)-Given        0055 (5 mg)-Given       0658 (5 mg)-Given       1216 (5 mg)-Given       (1622)-Not Given [C]       2144-Med Discontinued

## 2017-05-22 NOTE — ANESTHESIA PROCEDURE NOTES
Peripheral nerve/Neuraxial procedure note : intrathecal  Pre-Procedure  Performed by CALIN BLUM  Location: OR      Pre-Anesthestic Checklist: patient identified, IV checked, site marked, risks and benefits discussed, informed consent, monitors and equipment checked, pre-op evaluation, at physician/surgeon's request and post-op pain management    Timeout  Correct Patient: Yes   Correct Procedure: Yes   Correct Site: Yes   Correct Laterality: Yes   Correct Position: Yes   Site Marked: Yes   .   Procedure Documentation    .    Procedure:    Intrathecal.  Insertion Site:L2-3  (midline approach)      Patient Prep;povidone-iodine 7.5% surgical scrub.  .  Needle: (). # of attempts: 1. # of redirects:. Spinal Needle: Brennen tip 24 G. 3.5 in.  Introducer used. Introducer: 20 G. .     Assessment/Narrative  Paresthesias: No.  .  .  clear CSF fluid removed while sitting   . Comments:  Patient sitting on edge of OR bed, lower back cleaned and prepped in sterile fashion with betadine. 1% lido used to numb area. Introducer placed, spinal needle through introducer. Appropriate flow of CSF and confirmed with aspiration via syringe. Spinal dose given, 12 mg 0.75% bupivacaine. No complications.

## 2017-05-23 ENCOUNTER — APPOINTMENT (OUTPATIENT)
Dept: PHYSICAL THERAPY | Facility: CLINIC | Age: 72
DRG: 470 | End: 2017-05-23
Attending: ORTHOPAEDIC SURGERY
Payer: MEDICARE

## 2017-05-23 LAB
ANION GAP SERPL CALCULATED.3IONS-SCNC: 8 MMOL/L (ref 3–14)
BUN SERPL-MCNC: 16 MG/DL (ref 7–30)
CALCIUM SERPL-MCNC: 8 MG/DL (ref 8.5–10.1)
CHLORIDE SERPL-SCNC: 104 MMOL/L (ref 94–109)
CO2 SERPL-SCNC: 25 MMOL/L (ref 20–32)
CREAT SERPL-MCNC: 0.69 MG/DL (ref 0.52–1.04)
GFR SERPL CREATININE-BSD FRML MDRD: 83 ML/MIN/1.7M2
GLUCOSE SERPL-MCNC: 108 MG/DL (ref 70–99)
HGB BLD-MCNC: 10.2 G/DL (ref 11.7–15.7)
POTASSIUM SERPL-SCNC: 4.1 MMOL/L (ref 3.4–5.3)
SODIUM SERPL-SCNC: 137 MMOL/L (ref 133–144)

## 2017-05-23 PROCEDURE — 25000125 ZZHC RX 250: Performed by: ORTHOPAEDIC SURGERY

## 2017-05-23 PROCEDURE — 99233 SBSQ HOSP IP/OBS HIGH 50: CPT | Performed by: PHYSICIAN ASSISTANT

## 2017-05-23 PROCEDURE — 25000128 H RX IP 250 OP 636: Performed by: ORTHOPAEDIC SURGERY

## 2017-05-23 PROCEDURE — 85018 HEMOGLOBIN: CPT | Performed by: ORTHOPAEDIC SURGERY

## 2017-05-23 PROCEDURE — 99207 ZZC APP CREDIT; MD BILLING SHARED VISIT: CPT | Performed by: PHYSICIAN ASSISTANT

## 2017-05-23 PROCEDURE — 97110 THERAPEUTIC EXERCISES: CPT | Mod: GP

## 2017-05-23 PROCEDURE — 12000007 ZZH R&B INTERMEDIATE

## 2017-05-23 PROCEDURE — 36415 COLL VENOUS BLD VENIPUNCTURE: CPT | Performed by: ORTHOPAEDIC SURGERY

## 2017-05-23 PROCEDURE — 40000193 ZZH STATISTIC PT WARD VISIT

## 2017-05-23 PROCEDURE — 97116 GAIT TRAINING THERAPY: CPT | Mod: GP

## 2017-05-23 PROCEDURE — A9270 NON-COVERED ITEM OR SERVICE: HCPCS | Mod: GY | Performed by: ORTHOPAEDIC SURGERY

## 2017-05-23 PROCEDURE — 25000132 ZZH RX MED GY IP 250 OP 250 PS 637: Mod: GY | Performed by: ORTHOPAEDIC SURGERY

## 2017-05-23 PROCEDURE — S0077 INJECTION, CLINDAMYCIN PHOSP: HCPCS | Performed by: ORTHOPAEDIC SURGERY

## 2017-05-23 PROCEDURE — 25000128 H RX IP 250 OP 636: Performed by: PHYSICIAN ASSISTANT

## 2017-05-23 PROCEDURE — 80048 BASIC METABOLIC PNL TOTAL CA: CPT | Performed by: ORTHOPAEDIC SURGERY

## 2017-05-23 PROCEDURE — 99207 ZZC CDG-MDM COMPONENT: MEETS MODERATE - UP CODED: CPT | Performed by: PHYSICIAN ASSISTANT

## 2017-05-23 PROCEDURE — 97530 THERAPEUTIC ACTIVITIES: CPT | Mod: GP

## 2017-05-23 RX ORDER — HYDROCODONE BITARTRATE AND ACETAMINOPHEN 5; 325 MG/1; MG/1
1-2 TABLET ORAL EVERY 4 HOURS PRN
Status: DISCONTINUED | OUTPATIENT
Start: 2017-05-23 | End: 2017-05-26 | Stop reason: HOSPADM

## 2017-05-23 RX ORDER — METOCLOPRAMIDE HYDROCHLORIDE 5 MG/ML
5 INJECTION INTRAMUSCULAR; INTRAVENOUS EVERY 6 HOURS PRN
Status: DISCONTINUED | OUTPATIENT
Start: 2017-05-23 | End: 2017-05-26 | Stop reason: HOSPADM

## 2017-05-23 RX ORDER — METOCLOPRAMIDE 5 MG/1
5 TABLET ORAL EVERY 6 HOURS PRN
Status: DISCONTINUED | OUTPATIENT
Start: 2017-05-23 | End: 2017-05-26 | Stop reason: HOSPADM

## 2017-05-23 RX ADMIN — HYDROMORPHONE HYDROCHLORIDE 0.5 MG: 1 INJECTION, SOLUTION INTRAMUSCULAR; INTRAVENOUS; SUBCUTANEOUS at 22:36

## 2017-05-23 RX ADMIN — PROCHLORPERAZINE EDISYLATE 5 MG: 5 INJECTION INTRAMUSCULAR; INTRAVENOUS at 20:23

## 2017-05-23 RX ADMIN — BUDESONIDE AND FORMOTEROL FUMARATE DIHYDRATE 2 PUFF: 80; 4.5 AEROSOL RESPIRATORY (INHALATION) at 20:21

## 2017-05-23 RX ADMIN — SODIUM CHLORIDE: 9 INJECTION, SOLUTION INTRAVENOUS at 22:36

## 2017-05-23 RX ADMIN — SENNOSIDES AND DOCUSATE SODIUM 1 TABLET: 8.6; 5 TABLET ORAL at 09:52

## 2017-05-23 RX ADMIN — LEVOTHYROXINE SODIUM 50 MCG: 50 TABLET ORAL at 06:57

## 2017-05-23 RX ADMIN — ALBUTEROL SULFATE 2 PUFF: 90 INHALANT RESPIRATORY (INHALATION) at 20:21

## 2017-05-23 RX ADMIN — CLINDAMYCIN PHOSPHATE 900 MG: 18 INJECTION, SOLUTION INTRAVENOUS at 02:08

## 2017-05-23 RX ADMIN — BUDESONIDE AND FORMOTEROL FUMARATE DIHYDRATE 2 PUFF: 80; 4.5 AEROSOL RESPIRATORY (INHALATION) at 09:00

## 2017-05-23 RX ADMIN — ENOXAPARIN SODIUM 40 MG: 40 INJECTION SUBCUTANEOUS at 11:15

## 2017-05-23 RX ADMIN — HYDROMORPHONE HYDROCHLORIDE 0.5 MG: 1 INJECTION, SOLUTION INTRAMUSCULAR; INTRAVENOUS; SUBCUTANEOUS at 16:48

## 2017-05-23 RX ADMIN — CYCLOBENZAPRINE HYDROCHLORIDE 5 MG: 5 TABLET, FILM COATED ORAL at 15:44

## 2017-05-23 RX ADMIN — OXYCODONE HYDROCHLORIDE 5 MG: 5 TABLET ORAL at 06:58

## 2017-05-23 RX ADMIN — POLYETHYLENE GLYCOL 400 AND PROPYLENE GLYCOL 1 DROP: 4; 3 SOLUTION/ DROPS OPHTHALMIC at 09:53

## 2017-05-23 RX ADMIN — METOCLOPRAMIDE 5 MG: 5 INJECTION, SOLUTION INTRAMUSCULAR; INTRAVENOUS at 22:36

## 2017-05-23 RX ADMIN — OXYCODONE HYDROCHLORIDE 5 MG: 5 TABLET ORAL at 00:55

## 2017-05-23 RX ADMIN — KETOROLAC TROMETHAMINE 15 MG: 15 INJECTION, SOLUTION INTRAMUSCULAR; INTRAVENOUS at 04:20

## 2017-05-23 RX ADMIN — ACETAMINOPHEN 975 MG: 325 TABLET, FILM COATED ORAL at 06:57

## 2017-05-23 RX ADMIN — SODIUM CHLORIDE: 9 INJECTION, SOLUTION INTRAVENOUS at 09:25

## 2017-05-23 RX ADMIN — HYDROMORPHONE HYDROCHLORIDE 0.5 MG: 1 INJECTION, SOLUTION INTRAMUSCULAR; INTRAVENOUS; SUBCUTANEOUS at 19:52

## 2017-05-23 RX ADMIN — ONDANSETRON 4 MG: 4 TABLET, ORALLY DISINTEGRATING ORAL at 15:48

## 2017-05-23 RX ADMIN — OXYCODONE HYDROCHLORIDE 5 MG: 5 TABLET ORAL at 12:16

## 2017-05-23 RX ADMIN — ACETAMINOPHEN 975 MG: 325 TABLET, FILM COATED ORAL at 15:45

## 2017-05-23 RX ADMIN — KETOROLAC TROMETHAMINE 15 MG: 15 INJECTION, SOLUTION INTRAMUSCULAR; INTRAVENOUS at 09:52

## 2017-05-23 NOTE — PLAN OF CARE
Problem: Goal Outcome Summary  Goal: Goal Outcome Summary  [facility] Care Plan: TCU then home with OP PT     Current Functional Status: Mod I with bed mobility, SBA for transfers and gait with walker, amb 100'.  Limited toward end of session by nausea and dizziness, BP stable.     Barriers to Plan: No barriers to TCU.  Barrier to home nausea and dizziness with activity.

## 2017-05-23 NOTE — PLAN OF CARE
Problem: Goal Outcome Summary  Goal: Goal Outcome Summary  Outcome: No Change  Patient alert and oriented x4. VSS on 1L O2. Pain controlled with scheduled meds and 5 mg oxycodone. Up with 1, walker, gait belt to C. Regular diet. IVF infusing. CMS intact. Will continue to monitor.

## 2017-05-23 NOTE — PROGRESS NOTES
Care Transition Initial Assessment - ROSMERY  Reason For Consult: discharge planning  Met with: Patient  Active Problems:    S/P total knee arthroplasty         DATA  Lives With: spouse  Living Arrangements: house  Description of Support System: Supportive, Involved  Who is your support system?:   Support Assessment: Adequate family and caregiver support, Adequate social supports.   Identified issues/concerns regarding health management:            Quality Of Family Relationships: supportive, involved  Transportation Available: TBD      ASSESSMENT  Cognitive Status:  Awake, alert and oriented X3.  Concerns to be addressed:     Per automatic referral, SW met with patient to discuss discharge planning needs.  Patient is a 71-year-old female who was admitted to the hospital on 5-22-17 for an elective left TKA.  Prior to hospitalization, patient was living in a house with her  where they were managing well and patient was independent at home.  Patient is aware that she will need to go to Rehab prior to returning home which is concurrent with the [facility] careplan.  Patient states that the plan is for her to go to [facility].  Patient would like a private room at the facility and is aware of the $40 per day private room fee that is not covered under her insurance.  SW made a referral to the facility via [facility] to have them assess patient for a possible admission for Thursday May 25.  SW will follow up regarding transportation once the discharge plan has been finalized.       PLAN  Financial costs for the patient includes: Discussed the $40 per day private room fee that is not covered under her insurance.  Patient given options and choices for discharge: TCU facility list offered.  Patient/family is agreeable to the plan?  Yes  Patient Goals and Preferences: TCU at discharge.  Patient anticipates discharging to:  TCU at discharge.    DOTTIE De Dios      ROSMERY  D: ROSMERY following for  discharge planning needs.  SW received a message from [place] in Admissions at [facility] stating that they will have a private room available for patient on Thursday.  I: SW updated patient on the above and discussed transportation to get to Rehab on Thursday.  Patient confirmed that her  will transport her on Thursday at around 1300.  SW spoke to [place] in Admissions at [facility] to confirm the bed and to update her on the transport time for Thursday.  A: Patient is alert and oriented X3.  P: SW will continue to follow and assist with finalizing the discharge plan as appropriate.    Jojo Jolley, DOTTIE

## 2017-05-23 NOTE — PROVIDER NOTIFICATION
"Pt c/o feeling lightheaded and \"sweaty\" while resting in bed. BP stable. MD notified. Order received for IVF bolus.    "

## 2017-05-23 NOTE — PLAN OF CARE
Problem: Goal Outcome Summary  Goal: Goal Outcome Summary  Outcome: No Change  1530: Pt A&Ox4; VSS; c/o intermittent lightheadedness; 500 cc NS bolus given. BP stable. CMS intact. Hemovac dc'd and dressing on L knee changed. (incsion WNL/staples intact). Up with assist of 1 person/walker. This afternoon, c/o LLE muscle spasm/ Flexeril given. Pt also having c/o nausea without emesis/ zofran given; oncoming RN aware. Will continue to monitor.

## 2017-05-23 NOTE — PLAN OF CARE
Problem: Knee Replacement, Total (Adult)  Goal: Signs and Symptoms of Listed Potential Problems Will be Absent or Manageable (Knee Replacement, Total)  Signs and symptoms of listed potential problems will be absent or manageable by discharge/transition of care (reference Knee Replacement, Total (Adult) CPG).   Outcome: No Change  A&O x 4, VSS on RA, pain controlled with oral analgesics, drsg CDI, CMS intact, up with assist of 1 walker, voiding adequately in BSC, IV infusing, continue to monitor.

## 2017-05-23 NOTE — PROGRESS NOTES
SPIRITUAL HEALTH SERVICES Progress Note  [facility] 54    PRIMARY FOCUS:      Emotional/spiritual/Muslim distress    Support for coping    ILLNESS CIRCUMSTANCES:    Reviewed documentation. Reflective conversation shared with Jil which integrated elements of illness and family narratives.         Context of Serious Illness/Symptom(s) -  Total Knee Arthroplasty performed yesterday    Resources for Support - Family, mostly       DISTRESS:      Emotional/Existential/Relational Distress - Pt expresses concern over what she perceives as a conflict between her emotional needs and the needs of the doctor overseeing her discharge.  Pt says that she wants very much to continue rehab at a TCU because she does not anticipate her spouse being able to provide for her physical and emotional needs after discharge.  She says that the doctor would prefer her to discharge to home where she would be less likely to encounter infectious agents.  Pt believes that the doctor provides this as an excuse for his need to follow requirements and guidelines set by the government and insurance companies.  Pt states that she refuses to discharge to home.    Spiritual/Sabianist Distress - None discussed.  Pt speaks of being blessed in her life, including in the need to care for her very elderly mother and younger daughter who lives with MS.    Social/Cultural/Economic Distress - None discussed.       SPIRITUAL/Latter-day (Coping):      Muslim/Lin - [employer].    Spiritual Practice(s) - Prayer and devotional reading.    Emotional/Existential/Relational Connections - Pt identifies that many of her connections are ones of her providing caregiving, such as to her mother and daughter living with disability.  She also says that her spouse is a good and caring man, although he is analytical and has difficulty providing empathy.      GOALS OF CARE:    Goals of Care - Full recovery from knee replacement, rehab provided at a  TCU    Meaning/Sense-Making - None discussed      PLAN: SH provided emotional and spiritual support as well as a blessing.  SH also shared pt's concerns regarding discharge with SW, who verified that pt will discharge to TCU and stated that she would be sure to confirm that with pt as well.                                                                                                                 Danni De Paz M.Div.  Chaplain Resident  Pager [phone]
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
## 2017-05-24 ENCOUNTER — APPOINTMENT (OUTPATIENT)
Dept: PHYSICAL THERAPY | Facility: CLINIC | Age: 72
DRG: 470 | End: 2017-05-24
Attending: ORTHOPAEDIC SURGERY
Payer: MEDICARE

## 2017-05-24 LAB
CREAT SERPL-MCNC: 0.53 MG/DL (ref 0.52–1.04)
GFR SERPL CREATININE-BSD FRML MDRD: NORMAL ML/MIN/1.7M2
HGB BLD-MCNC: 9.4 G/DL (ref 11.7–15.7)
IRON SATN MFR SERPL: 9 % (ref 15–46)
IRON SERPL-MCNC: 19 UG/DL (ref 35–180)
POTASSIUM SERPL-SCNC: 3.6 MMOL/L (ref 3.4–5.3)
TIBC SERPL-MCNC: 218 UG/DL (ref 240–430)

## 2017-05-24 PROCEDURE — 36415 COLL VENOUS BLD VENIPUNCTURE: CPT | Performed by: HOSPITALIST

## 2017-05-24 PROCEDURE — A9270 NON-COVERED ITEM OR SERVICE: HCPCS | Mod: GY | Performed by: ORTHOPAEDIC SURGERY

## 2017-05-24 PROCEDURE — 36415 COLL VENOUS BLD VENIPUNCTURE: CPT | Performed by: ORTHOPAEDIC SURGERY

## 2017-05-24 PROCEDURE — 84132 ASSAY OF SERUM POTASSIUM: CPT | Performed by: HOSPITALIST

## 2017-05-24 PROCEDURE — 40000894 ZZH STATISTIC OT IP EVAL DEFER

## 2017-05-24 PROCEDURE — 40000193 ZZH STATISTIC PT WARD VISIT

## 2017-05-24 PROCEDURE — 83540 ASSAY OF IRON: CPT | Performed by: HOSPITALIST

## 2017-05-24 PROCEDURE — 25000132 ZZH RX MED GY IP 250 OP 250 PS 637: Mod: GY | Performed by: PHYSICIAN ASSISTANT

## 2017-05-24 PROCEDURE — 12000007 ZZH R&B INTERMEDIATE

## 2017-05-24 PROCEDURE — 25000128 H RX IP 250 OP 636: Performed by: ORTHOPAEDIC SURGERY

## 2017-05-24 PROCEDURE — 25000128 H RX IP 250 OP 636: Performed by: PHYSICIAN ASSISTANT

## 2017-05-24 PROCEDURE — 82565 ASSAY OF CREATININE: CPT | Performed by: HOSPITALIST

## 2017-05-24 PROCEDURE — 85018 HEMOGLOBIN: CPT | Performed by: ORTHOPAEDIC SURGERY

## 2017-05-24 PROCEDURE — 97110 THERAPEUTIC EXERCISES: CPT | Mod: GP

## 2017-05-24 PROCEDURE — 97116 GAIT TRAINING THERAPY: CPT | Mod: GP

## 2017-05-24 PROCEDURE — A9270 NON-COVERED ITEM OR SERVICE: HCPCS | Mod: GY | Performed by: PHYSICIAN ASSISTANT

## 2017-05-24 PROCEDURE — 83550 IRON BINDING TEST: CPT | Performed by: HOSPITALIST

## 2017-05-24 PROCEDURE — 25000125 ZZHC RX 250: Performed by: ORTHOPAEDIC SURGERY

## 2017-05-24 PROCEDURE — 25000132 ZZH RX MED GY IP 250 OP 250 PS 637: Mod: GY | Performed by: ORTHOPAEDIC SURGERY

## 2017-05-24 PROCEDURE — 99233 SBSQ HOSP IP/OBS HIGH 50: CPT | Performed by: HOSPITALIST

## 2017-05-24 RX ORDER — AMOXICILLIN 250 MG
1-2 CAPSULE ORAL 2 TIMES DAILY
Qty: 50 TABLET | DISCHARGE
Start: 2017-05-24

## 2017-05-24 RX ORDER — HYDROCODONE BITARTRATE AND ACETAMINOPHEN 5; 325 MG/1; MG/1
1-2 TABLET ORAL EVERY 4 HOURS PRN
Qty: 40 TABLET | Refills: 0 | Status: SHIPPED | DISCHARGE
Start: 2017-05-24

## 2017-05-24 RX ADMIN — BUDESONIDE AND FORMOTEROL FUMARATE DIHYDRATE 2 PUFF: 80; 4.5 AEROSOL RESPIRATORY (INHALATION) at 06:39

## 2017-05-24 RX ADMIN — HYDROCODONE BITARTRATE AND ACETAMINOPHEN 1 TABLET: 5; 325 TABLET ORAL at 12:03

## 2017-05-24 RX ADMIN — HYDROCODONE BITARTRATE AND ACETAMINOPHEN 1 TABLET: 5; 325 TABLET ORAL at 04:45

## 2017-05-24 RX ADMIN — ACETAMINOPHEN 975 MG: 325 TABLET, FILM COATED ORAL at 06:31

## 2017-05-24 RX ADMIN — LEVOTHYROXINE SODIUM 50 MCG: 50 TABLET ORAL at 06:32

## 2017-05-24 RX ADMIN — PROCHLORPERAZINE EDISYLATE 5 MG: 5 INJECTION INTRAMUSCULAR; INTRAVENOUS at 04:45

## 2017-05-24 RX ADMIN — ONDANSETRON 4 MG: 4 TABLET, ORALLY DISINTEGRATING ORAL at 17:03

## 2017-05-24 RX ADMIN — HYDROCODONE BITARTRATE AND ACETAMINOPHEN 2 TABLET: 5; 325 TABLET ORAL at 17:03

## 2017-05-24 RX ADMIN — CYCLOBENZAPRINE HYDROCHLORIDE 5 MG: 5 TABLET, FILM COATED ORAL at 06:32

## 2017-05-24 RX ADMIN — FLUOXETINE 20 MG: 20 CAPSULE ORAL at 21:24

## 2017-05-24 RX ADMIN — ENOXAPARIN SODIUM 40 MG: 40 INJECTION SUBCUTANEOUS at 10:57

## 2017-05-24 RX ADMIN — SODIUM CHLORIDE: 9 INJECTION, SOLUTION INTRAVENOUS at 10:05

## 2017-05-24 RX ADMIN — LOSARTAN POTASSIUM 100 MG: 100 TABLET, FILM COATED ORAL at 08:04

## 2017-05-24 RX ADMIN — HYDROCODONE BITARTRATE AND ACETAMINOPHEN 2 TABLET: 5; 325 TABLET ORAL at 21:23

## 2017-05-24 RX ADMIN — SENNOSIDES AND DOCUSATE SODIUM 2 TABLET: 8.6; 5 TABLET ORAL at 21:24

## 2017-05-24 RX ADMIN — CYCLOBENZAPRINE HYDROCHLORIDE 5 MG: 5 TABLET, FILM COATED ORAL at 14:07

## 2017-05-24 RX ADMIN — SENNOSIDES AND DOCUSATE SODIUM 2 TABLET: 8.6; 5 TABLET ORAL at 08:04

## 2017-05-24 RX ADMIN — POLYETHYLENE GLYCOL 400 AND PROPYLENE GLYCOL 1 DROP: 4; 3 SOLUTION/ DROPS OPHTHALMIC at 08:17

## 2017-05-24 RX ADMIN — BUDESONIDE AND FORMOTEROL FUMARATE DIHYDRATE 2 PUFF: 80; 4.5 AEROSOL RESPIRATORY (INHALATION) at 21:24
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
## 2017-05-24 NOTE — DISCHARGE SUMMARY
DISCHARGE SUMMARY    NAME:  Jil Conde  AGE:  71 year old  YOB: 1945  MRN#:  [id number]    Jil Conde was admitted for elective total knee arthroplasty.  The surgery was performed on 5/22/2017.  The postoperative course is documented in the medical record.  There were no complications. The patient was felt ready for discharge to rehab on POD #3 with post-discharge physical therapy and outpatient visit prearranged.     Lab Results   Component Value Date    HGB 9.4 (L) 05/24/2017    HGB 10.2 (L) 05/23/2017    HGB 12.4 05/22/2017       The patient received 0 units transfusion.      FINAL DISCHARGE DIAGNOSIS:  Degenerative osteoarthritis left knee.               Acute blood loss anemia     SURGICAL PROCEDURE THIS ADMISSION:  Left total knee arthroplasty.         OLIVER MARTIN MD      CC: Fax [phone]         Oliver Martin MD

## 2017-05-24 NOTE — PROGRESS NOTES
PAS-RR    D: Per DHS regulation, SW completed and submitted PAS-RR to [place] [employer] Direct Connect via the Senior LinkAge Line.  PAS-RR confirmation # is : [id number].    I: SW spoke with patient and she is aware a PAS-RR has been submitted.  SW reviewed with patient that she may be contacted for a follow up appointment within 10 days of hospital discharge if their SNF stay is < 30 days.  Contact information for Senior LinkAge Line was also provided.    A: Patient verbalized understanding.    P: Further questions may be directed to [facility] LinkAge Line at #[phone], option #4 for PAS-RR staff.    DOTTIE De Dios

## 2017-05-24 NOTE — PLAN OF CARE
Problem: Goal Outcome Summary  Goal: Goal Outcome Summary  Outcome: Improving  Pt remains a/O up with A1 walker GB. Iv infusing. Iv dilaudid for pain. Pt had emesis x4 this shift zofran, and compazine ineffective. New order for reglan, has been effective thus far. Oxycodone d/c'd, New order for norco  in attempt to prevent nausea. Immobilizer on at HS. Continue to monitor.

## 2017-05-24 NOTE — PLAN OF CARE
Problem: Goal Outcome Summary  Goal: Goal Outcome Summary  Outcome: No Change  compazine for c/o nausea, helpful, O2 at 2liter/nc vss, A&O X 4, norco for pain.

## 2017-05-24 NOTE — PLAN OF CARE
Problem: Goal Outcome Summary  Goal: Goal Outcome Summary  Outcome: Therapy, progress toward functional goals as expected  BPCI Care Plan: TCU then home with OP PT     Current Functional Status: Sit to/from stand with FWW and SBA, cues for safe hand placement. Mod I with bed mobility. Amb 10 ft x 2 with FWW and CGA, no KI donned and no knee buckling noted. Tolerates TKA exs moderately due to increased pain 7/10 and severe nausea. Pt returned to room via w/c at end of session. L knee AAROM 8-65 degrees.     Barriers to Plan: No barriers to TCU.  Barrier to home nausea and dizziness with activity.

## 2017-05-24 NOTE — PLAN OF CARE
Problem: Goal Outcome Summary  Goal: Goal Outcome Summary  OT: Order received, chart reviewed and per discussion with patient, pt is discharging to TCU. Will defer skilled OT to next level of care. Pt educated on OT role and deferring of skilled OT to TCU, pt verbalized understanding with no questions or concerns. OT order completed.

## 2017-05-24 NOTE — PROGRESS NOTES
Paged by nursing requesting additional antiemetics. Zofran and Compazine already ordered. Will add Reglan; use judiciously. Judicious use of narcotics.

## 2017-05-24 NOTE — DISCHARGE INSTRUCTIONS
DISCHARGE INSTRUCTIONS AFTER YOUR TOTAL KNEE REPLACEMENT     VIC MARTIN MD       Instructions to care for your wound at home:   Change the dressing daily.  Inspect your incision at the time of dressing change for increased redness, tenderness, swelling, or drainage along the incision line.  Some bruising or discoloration is usually present, but call my office for any changes in appearance that concern you.  Also call if you develop a fever above 101 degrees.     You may shower directly over the wound beginning 4 days after your surgery, but do not submerge the wound under water until after your post operative visit when the sutures are removed and the wound is completely sealed without drainage.    Activities:  Physical activity may be resumed gradually according to your comfort level. You may bear weight on your operative leg as tolerated with your crutches or walker as instructed by your therapist.  Follow your home exercise program.  Ice your knee after exercising.        Wear your knee immobilizer at night only until your office visit in 2 weeks to maintain full knee extension.  Do not use the immobilizer during the day unless otherwise instructed.      Wear the anti-embolism stockings day and night until seen in the office for your post operative visit. Remove them twice daily for one hour at a time. Keep the compression stockings flat on your leg.  Do not allow them to roll up or crease your skin.  Call if you develop calf pain.     Outpatient Physical Therapy and home exercises:  Outpatient physical therapy visits are required following discharge from the hospital. The referral for these outpatient therapy visits is routinely given to you at the time of your surgical scheduling. You should have already scheduled your therapy sessions in advance.  If you have not done so, please immediately call the therapy site of your choice to schedule the physical therapy regimen that has been prescribed for you.  You  may discontinue the crutches or walker per the therapist's recommendation.      Medications:  New medications for you on discharge will include a pain medication, a stool softener while on the narcotic pain medication, and a blood thinner.  Detailed instructions will come with those medications.  You will also receive instructions on when to resume your home medications.     If you routinely take Aspirin 81 mg, hold the Aspirin while taking the formal blood thinner medication. Then take Aspirin 325 mg (1 tablet) daily for 4 weeks.  Then resume your Aspirin 81 mg daily if that is your routine.        Antibiotic coverage will be needed before any type of dental procedure.  This is a life long recommendation.  You should notify your dentist of your total knee surgery and call your dentist or our office one week before a dental appointment for antibiotics.        Clinic follow-up appointment:  Your clinic follow-up appointment has been prearranged.  Call [phone] with any questions.    Oliver Daniels MD     You have been discharged to [facility]  Phone Number is [phone]  Fax Number is [phone]

## 2017-05-24 NOTE — PLAN OF CARE
Problem: Goal Outcome Summary  Goal: Goal Outcome Summary  Outcome: Improving  Pt is A & O x 4. Lungs sound clear, bowel sounds active, cms intact. Up with 1 and walker. Dressing was changed. Received Norco for pain, flexeril for spasm, and zofran for mild nausea. Will continue to monitor.

## 2017-05-25 ENCOUNTER — APPOINTMENT (OUTPATIENT)
Dept: PHYSICAL THERAPY | Facility: CLINIC | Age: 72
DRG: 470 | End: 2017-05-25
Attending: ORTHOPAEDIC SURGERY
Payer: MEDICARE

## 2017-05-25 ENCOUNTER — APPOINTMENT (OUTPATIENT)
Dept: GENERAL RADIOLOGY | Facility: CLINIC | Age: 72
DRG: 470 | End: 2017-05-25
Attending: HOSPITALIST
Payer: MEDICARE

## 2017-05-25 LAB
ANION GAP SERPL CALCULATED.3IONS-SCNC: 7 MMOL/L (ref 3–14)
BUN SERPL-MCNC: 8 MG/DL (ref 7–30)
CALCIUM SERPL-MCNC: 8 MG/DL (ref 8.5–10.1)
CHLORIDE SERPL-SCNC: 107 MMOL/L (ref 94–109)
CO2 SERPL-SCNC: 26 MMOL/L (ref 20–32)
CREAT SERPL-MCNC: 0.55 MG/DL (ref 0.52–1.04)
GFR SERPL CREATININE-BSD FRML MDRD: ABNORMAL ML/MIN/1.7M2
GLUCOSE SERPL-MCNC: 95 MG/DL (ref 70–99)
HCT VFR BLD AUTO: 27.7 % (ref 35–47)
HGB BLD-MCNC: 8.9 G/DL (ref 11.7–15.7)
PLATELET # BLD AUTO: 274 10E9/L (ref 150–450)
POTASSIUM SERPL-SCNC: 3.8 MMOL/L (ref 3.4–5.3)
SODIUM SERPL-SCNC: 140 MMOL/L (ref 133–144)

## 2017-05-25 PROCEDURE — 99233 SBSQ HOSP IP/OBS HIGH 50: CPT | Performed by: HOSPITALIST

## 2017-05-25 PROCEDURE — 25000132 ZZH RX MED GY IP 250 OP 250 PS 637: Mod: GY | Performed by: ORTHOPAEDIC SURGERY

## 2017-05-25 PROCEDURE — 85027 COMPLETE CBC AUTOMATED: CPT | Performed by: HOSPITALIST

## 2017-05-25 PROCEDURE — 80048 BASIC METABOLIC PNL TOTAL CA: CPT | Performed by: HOSPITALIST

## 2017-05-25 PROCEDURE — A9270 NON-COVERED ITEM OR SERVICE: HCPCS | Mod: GY | Performed by: ORTHOPAEDIC SURGERY

## 2017-05-25 PROCEDURE — 40000193 ZZH STATISTIC PT WARD VISIT

## 2017-05-25 PROCEDURE — 97110 THERAPEUTIC EXERCISES: CPT | Mod: GP | Performed by: PHYSICAL THERAPIST

## 2017-05-25 PROCEDURE — 99207 ZZC CDG-MDM COMPONENT: MEETS MODERATE - UP CODED: CPT | Performed by: HOSPITALIST

## 2017-05-25 PROCEDURE — 36415 COLL VENOUS BLD VENIPUNCTURE: CPT | Performed by: HOSPITALIST

## 2017-05-25 PROCEDURE — 97110 THERAPEUTIC EXERCISES: CPT | Mod: GP

## 2017-05-25 PROCEDURE — 25000128 H RX IP 250 OP 636: Performed by: PHYSICIAN ASSISTANT

## 2017-05-25 PROCEDURE — 71020 XR CHEST 2 VW: CPT

## 2017-05-25 PROCEDURE — 25000128 H RX IP 250 OP 636: Performed by: ORTHOPAEDIC SURGERY

## 2017-05-25 PROCEDURE — A9270 NON-COVERED ITEM OR SERVICE: HCPCS | Mod: GY | Performed by: PHYSICIAN ASSISTANT

## 2017-05-25 PROCEDURE — 12000007 ZZH R&B INTERMEDIATE

## 2017-05-25 PROCEDURE — 40000193 ZZH STATISTIC PT WARD VISIT: Performed by: PHYSICAL THERAPIST

## 2017-05-25 PROCEDURE — 97116 GAIT TRAINING THERAPY: CPT | Mod: GP

## 2017-05-25 PROCEDURE — 25000128 H RX IP 250 OP 636: Performed by: HOSPITALIST

## 2017-05-25 PROCEDURE — 25000132 ZZH RX MED GY IP 250 OP 250 PS 637: Mod: GY | Performed by: PHYSICIAN ASSISTANT

## 2017-05-25 RX ORDER — FUROSEMIDE 10 MG/ML
20 INJECTION INTRAMUSCULAR; INTRAVENOUS ONCE
Status: COMPLETED | OUTPATIENT
Start: 2017-05-25 | End: 2017-05-25

## 2017-05-25 RX ORDER — FERROUS SULFATE 325(65) MG
325 TABLET ORAL 2 TIMES DAILY
Qty: 30 TABLET | Refills: 2 | DISCHARGE
Start: 2017-05-25

## 2017-05-25 RX ORDER — FOLIC ACID 1 MG/1
1 TABLET ORAL DAILY
Qty: 100 TABLET | Refills: 3 | DISCHARGE
Start: 2017-05-25

## 2017-05-25 RX ADMIN — FUROSEMIDE 20 MG: 10 INJECTION, SOLUTION INTRAVENOUS at 11:48

## 2017-05-25 RX ADMIN — HYDROCODONE BITARTRATE AND ACETAMINOPHEN 2 TABLET: 5; 325 TABLET ORAL at 09:46

## 2017-05-25 RX ADMIN — SENNOSIDES AND DOCUSATE SODIUM 2 TABLET: 8.6; 5 TABLET ORAL at 09:46

## 2017-05-25 RX ADMIN — SENNOSIDES 1 TABLET: 8.6 TABLET, FILM COATED ORAL at 21:47

## 2017-05-25 RX ADMIN — BUDESONIDE AND FORMOTEROL FUMARATE DIHYDRATE 2 PUFF: 80; 4.5 AEROSOL RESPIRATORY (INHALATION) at 09:49

## 2017-05-25 RX ADMIN — LEVOTHYROXINE SODIUM 50 MCG: 50 TABLET ORAL at 05:58

## 2017-05-25 RX ADMIN — LOSARTAN POTASSIUM 100 MG: 100 TABLET, FILM COATED ORAL at 09:46

## 2017-05-25 RX ADMIN — BUDESONIDE AND FORMOTEROL FUMARATE DIHYDRATE 2 PUFF: 80; 4.5 AEROSOL RESPIRATORY (INHALATION) at 21:49

## 2017-05-25 RX ADMIN — HYDROCODONE BITARTRATE AND ACETAMINOPHEN 2 TABLET: 5; 325 TABLET ORAL at 21:46

## 2017-05-25 RX ADMIN — HYDROCODONE BITARTRATE AND ACETAMINOPHEN 2 TABLET: 5; 325 TABLET ORAL at 05:40

## 2017-05-25 RX ADMIN — SODIUM CHLORIDE: 9 INJECTION, SOLUTION INTRAVENOUS at 00:21

## 2017-05-25 RX ADMIN — HYDROCODONE BITARTRATE AND ACETAMINOPHEN 2 TABLET: 5; 325 TABLET ORAL at 01:25

## 2017-05-25 RX ADMIN — POLYETHYLENE GLYCOL 400 AND PROPYLENE GLYCOL 1 DROP: 4; 3 SOLUTION/ DROPS OPHTHALMIC at 09:46

## 2017-05-25 RX ADMIN — HYDROCODONE BITARTRATE AND ACETAMINOPHEN 2 TABLET: 5; 325 TABLET ORAL at 17:21

## 2017-05-25 RX ADMIN — SENNOSIDES AND DOCUSATE SODIUM 1 TABLET: 8.6; 5 TABLET ORAL at 21:52

## 2017-05-25 RX ADMIN — CYCLOBENZAPRINE HYDROCHLORIDE 5 MG: 5 TABLET, FILM COATED ORAL at 00:21

## 2017-05-25 RX ADMIN — CYCLOBENZAPRINE HYDROCHLORIDE 5 MG: 5 TABLET, FILM COATED ORAL at 21:46

## 2017-05-25 RX ADMIN — FLUOXETINE 20 MG: 20 CAPSULE ORAL at 21:47

## 2017-05-25 RX ADMIN — ENOXAPARIN SODIUM 40 MG: 40 INJECTION SUBCUTANEOUS at 09:48

## 2017-05-25 NOTE — PROGRESS NOTES
ROSMERY  D: SW following for discharge planning needs.  SW was informed that patient will not be ready for discharge until tomorrow due to being fluid overloaded.  I: ROSMERY spoke to  in Admissions at [facility] to update her on the change in the discharge date and she confirmed that they will still have a bed available for patient tomorrow.  SW will follow up tomorrow regarding transportation to get to Rehab tomorrow.  A: Patient is alert and oriented X3.  P: SW will continue to follow and assist with finalizing the discharge plan as appropriate.    DOTTIE De Dios

## 2017-05-25 NOTE — PLAN OF CARE
Problem: Goal Outcome Summary  Goal: Goal Outcome Summary  Outcome: Improving  A&O, vs stable, cms intact, drsg intact with dried drainage.  Up with assist x 1 and walker, pain controlled with Norco and flexeril.  Voiding adequately, denies nausea, IV saline locked.  Progressing per plan of care.
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
## 2017-05-25 NOTE — PLAN OF CARE
Problem: Goal Outcome Summary  Goal: Goal Outcome Summary  Outcome: Therapy, progress toward functional goals as expected  BPCI Care Plan: TCU then home with OP PT     Current Functional Status: Sit to/from stand with FWW and SBA. Mod I with bed mobility. Amb 50 ft x 1 with FWW and CGA, no KI donned and no knee buckling noted. Tolerates TKA exs moderately due to increased pain. Pt returned to room via w/c at end of session. L knee AAROM 8-71 degrees.     Barriers to Plan: No barriers to TCU.  Barrier to home: nausea and dizziness with activity.

## 2017-05-25 NOTE — PLAN OF CARE
Problem: Goal Outcome Summary  Goal: Goal Outcome Summary  Outcome: Improving  Pt is A & O x 4. Lungs sound clear, bowel sounds active, cms intact. Up with 1 and walker. Dressing was changed. Received Norco for pain. Received lasix IV for low O2 sat and fluid overload. Will continue to monitor.

## 2017-05-26 ENCOUNTER — APPOINTMENT (OUTPATIENT)
Dept: PHYSICAL THERAPY | Facility: CLINIC | Age: 72
DRG: 470 | End: 2017-05-26
Attending: ORTHOPAEDIC SURGERY
Payer: MEDICARE

## 2017-05-26 VITALS
HEART RATE: 72 BPM | HEIGHT: 61 IN | WEIGHT: 148.8 LBS | DIASTOLIC BLOOD PRESSURE: 56 MMHG | RESPIRATION RATE: 16 BRPM | SYSTOLIC BLOOD PRESSURE: 103 MMHG | BODY MASS INDEX: 28.09 KG/M2 | TEMPERATURE: 97.6 F | OXYGEN SATURATION: 93 %

## 2017-05-26 LAB
CREAT SERPL-MCNC: 0.48 MG/DL (ref 0.52–1.04)
GFR SERPL CREATININE-BSD FRML MDRD: ABNORMAL ML/MIN/1.7M2
HCT VFR BLD AUTO: 28.3 % (ref 35–47)
HGB BLD-MCNC: 9.3 G/DL (ref 11.7–15.7)
POTASSIUM SERPL-SCNC: 3.6 MMOL/L (ref 3.4–5.3)

## 2017-05-26 PROCEDURE — 97116 GAIT TRAINING THERAPY: CPT | Mod: GP

## 2017-05-26 PROCEDURE — 82565 ASSAY OF CREATININE: CPT | Performed by: HOSPITALIST

## 2017-05-26 PROCEDURE — 85014 HEMATOCRIT: CPT | Performed by: HOSPITALIST

## 2017-05-26 PROCEDURE — 40000193 ZZH STATISTIC PT WARD VISIT

## 2017-05-26 PROCEDURE — A9270 NON-COVERED ITEM OR SERVICE: HCPCS | Mod: GY | Performed by: ORTHOPAEDIC SURGERY

## 2017-05-26 PROCEDURE — 25000132 ZZH RX MED GY IP 250 OP 250 PS 637: Mod: GY | Performed by: HOSPITALIST

## 2017-05-26 PROCEDURE — 36415 COLL VENOUS BLD VENIPUNCTURE: CPT | Performed by: HOSPITALIST

## 2017-05-26 PROCEDURE — A9270 NON-COVERED ITEM OR SERVICE: HCPCS | Mod: GY | Performed by: PHYSICIAN ASSISTANT

## 2017-05-26 PROCEDURE — 25000132 ZZH RX MED GY IP 250 OP 250 PS 637: Mod: GY | Performed by: PHYSICIAN ASSISTANT

## 2017-05-26 PROCEDURE — 97110 THERAPEUTIC EXERCISES: CPT | Mod: GP

## 2017-05-26 PROCEDURE — 25000132 ZZH RX MED GY IP 250 OP 250 PS 637: Mod: GY | Performed by: ORTHOPAEDIC SURGERY

## 2017-05-26 PROCEDURE — 99232 SBSQ HOSP IP/OBS MODERATE 35: CPT | Performed by: HOSPITALIST

## 2017-05-26 PROCEDURE — A9270 NON-COVERED ITEM OR SERVICE: HCPCS | Mod: GY | Performed by: HOSPITALIST

## 2017-05-26 PROCEDURE — 84132 ASSAY OF SERUM POTASSIUM: CPT | Performed by: HOSPITALIST

## 2017-05-26 PROCEDURE — 25000128 H RX IP 250 OP 636: Performed by: ORTHOPAEDIC SURGERY

## 2017-05-26 PROCEDURE — 85018 HEMOGLOBIN: CPT | Performed by: HOSPITALIST

## 2017-05-26 RX ORDER — POTASSIUM CL/LIDO/0.9 % NACL 10MEQ/0.1L
10 INTRAVENOUS SOLUTION, PIGGYBACK (ML) INTRAVENOUS
Status: DISCONTINUED | OUTPATIENT
Start: 2017-05-26 | End: 2017-05-26 | Stop reason: HOSPADM

## 2017-05-26 RX ORDER — POTASSIUM CHLORIDE 1500 MG/1
20-40 TABLET, EXTENDED RELEASE ORAL
Status: DISCONTINUED | OUTPATIENT
Start: 2017-05-26 | End: 2017-05-26 | Stop reason: HOSPADM

## 2017-05-26 RX ORDER — METHYLPHENIDATE HYDROCHLORIDE 10 MG/1
10 TABLET ORAL EVERY 24 HOURS
Qty: 14 TABLET | Refills: 0 | Status: SHIPPED | OUTPATIENT
Start: 2017-05-26

## 2017-05-26 RX ORDER — HYDROCHLOROTHIAZIDE 25 MG/1
25 TABLET ORAL DAILY
Status: DISCONTINUED | OUTPATIENT
Start: 2017-05-26 | End: 2017-05-26 | Stop reason: HOSPADM

## 2017-05-26 RX ORDER — POTASSIUM CHLORIDE 7.45 MG/ML
10 INJECTION INTRAVENOUS
Status: DISCONTINUED | OUTPATIENT
Start: 2017-05-26 | End: 2017-05-26 | Stop reason: HOSPADM

## 2017-05-26 RX ORDER — FUROSEMIDE 20 MG
20 TABLET ORAL ONCE
Status: COMPLETED | OUTPATIENT
Start: 2017-05-26 | End: 2017-05-26

## 2017-05-26 RX ORDER — POTASSIUM CHLORIDE 29.8 MG/ML
20 INJECTION INTRAVENOUS
Status: DISCONTINUED | OUTPATIENT
Start: 2017-05-26 | End: 2017-05-26 | Stop reason: HOSPADM

## 2017-05-26 RX ORDER — METHYLPHENIDATE HYDROCHLORIDE 18 MG/1
18 TABLET, EXTENDED RELEASE ORAL EVERY MORNING
Qty: 15 TABLET | Refills: 0 | Status: SHIPPED | OUTPATIENT
Start: 2017-05-26

## 2017-05-26 RX ORDER — POTASSIUM CHLORIDE 1.5 G/1.58G
20-40 POWDER, FOR SOLUTION ORAL
Status: DISCONTINUED | OUTPATIENT
Start: 2017-05-26 | End: 2017-05-26 | Stop reason: HOSPADM

## 2017-05-26 RX ADMIN — CYCLOBENZAPRINE HYDROCHLORIDE 5 MG: 5 TABLET, FILM COATED ORAL at 06:36

## 2017-05-26 RX ADMIN — HYDROCODONE BITARTRATE AND ACETAMINOPHEN 2 TABLET: 5; 325 TABLET ORAL at 04:43

## 2017-05-26 RX ADMIN — POLYETHYLENE GLYCOL 400 AND PROPYLENE GLYCOL 1 DROP: 4; 3 SOLUTION/ DROPS OPHTHALMIC at 08:24

## 2017-05-26 RX ADMIN — HYDROCHLOROTHIAZIDE 25 MG: 25 TABLET ORAL at 08:22

## 2017-05-26 RX ADMIN — FUROSEMIDE 20 MG: 20 TABLET ORAL at 08:22

## 2017-05-26 RX ADMIN — ENOXAPARIN SODIUM 40 MG: 40 INJECTION SUBCUTANEOUS at 10:38

## 2017-05-26 RX ADMIN — LEVOTHYROXINE SODIUM 50 MCG: 50 TABLET ORAL at 06:36

## 2017-05-26 RX ADMIN — BUDESONIDE AND FORMOTEROL FUMARATE DIHYDRATE 2 PUFF: 80; 4.5 AEROSOL RESPIRATORY (INHALATION) at 08:24

## 2017-05-26 RX ADMIN — LOSARTAN POTASSIUM 100 MG: 100 TABLET, FILM COATED ORAL at 08:22

## 2017-05-26 RX ADMIN — SENNOSIDES AND DOCUSATE SODIUM 2 TABLET: 8.6; 5 TABLET ORAL at 08:22

## 2017-05-26 RX ADMIN — HYDROCODONE BITARTRATE AND ACETAMINOPHEN 2 TABLET: 5; 325 TABLET ORAL at 13:56

## 2017-05-26 RX ADMIN — HYDROCODONE BITARTRATE AND ACETAMINOPHEN 2 TABLET: 5; 325 TABLET ORAL at 09:11

## 2017-05-26 NOTE — PROGRESS NOTES
Hospitalist's update note:    CXR was followed up which showed pulm congestion/ fluid overload.  Lasix 20 mg IV given once,  2.1 L urine out put with lasix. BP remains good.  Weaned off O2.  Will not repeat dose.  Resume HCTZ at discharge.    Discussed with RN, possibly dc tomorrow.    Jonathan Kendall MD  Hospitalist

## 2017-05-26 NOTE — PLAN OF CARE
Problem: Goal Outcome Summary  Goal: Goal Outcome Summary  Outcome: Adequate for Discharge Date Met:  05/26/17  Pt is A & O x 4. Lungs sound clear, bowel sounds active, cms intact. Up with 1 and walker. Dressing was changed. Received Norco for pain. Received lasix oral for low O2 sat. AVS given. Was discharged to tcu

## 2017-05-26 NOTE — PROGRESS NOTES
ROSMERY  D: Per MD order, patient okay to discharge today to Rehab.  I: Due to patients TKA, intermittent confusion and being on oxygen and not being able to self regulate it, patient will require stretcher transportation upon discharge.  ROSMERY spoke to Ruddy at [facility] and scheduled transportation for today at 1430.  ROSMERY faxed the facesheet and PCS to [facility].  ROSMERY faxed the discharge orders and spoke to Sadia in Admissions at [facility] to update her that patient is ready for discharge today and the time of transportation.  A: Patient is alert and oriented.  Patient and family are aware and in agreement with the plan for patient to discharge today to Rehab.  P: Patient to discharge today to [facility].  ROSMERY will be available to assist as needed.    DOTTIE De Dios

## 2017-05-26 NOTE — PLAN OF CARE
Problem: Goal Outcome Summary  Goal: Goal Outcome Summary  Outcome: Therapy, progress toward functional goals as expected  BPCI Care Plan: TCU then home with OP PT     Current Functional Status: Sit to/from stand with FWW and SBA. Mod I with bed mobility. Amb 100 ft x 1 with FWW and CGA, no KI donned and no knee buckling noted. Up/down 3 steps x 1 with B rails and CGA. Tolerates TKA exs well despite increased pain. Pt returned to room via w/c at end of session. L knee AAROM 9-86 degrees.     Barriers to Plan: No barriers to TCU.  Barrier to home: nausea and dizziness with activity.       Pt discharged to TCU today.    PT goals partially met.
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
## 2017-05-26 NOTE — PLAN OF CARE
Problem: Goal Outcome Summary  Goal: Goal Outcome Summary  VSS 92% on 3 L oxygen overnight. BS active. LS clear. Denies nausea. Up with assist of 1. Voids in bathroom/bsc. Dressing cdi, cms intact.

## 2022-04-07 NOTE — PLAN OF CARE
Continues on plavix and aspirin.    Problem: Goal Outcome Summary  Goal: Goal Outcome Summary  PT-  Pt c/o feeling dizzy and sweaty at time of appt.  Declined PT.  Nurse present.

## 2023-09-11 NOTE — PROGRESS NOTES
[facility] Post-Op / Progress Note  Jil Conde is a 71 year old female    Today's Date:2017  Admission Date: 2017  POD # 2 TKA         Interval History:   Struggling with nausea which is common for her after surgeries  Knee doing fine            Physical Exam:   All vitals have been reviewed  Temperatures:  Current - Temp: 97.7  F (36.5  C); Max - Temp  Av.1  F (36.7  C)  Min: 97.7  F (36.5  C)  Max: 98.7  F (37.1  C)  Pulse range: Pulse  Av  Min: 75  Max: 75  Blood pressure range: Systolic (24hrs), Av , Min:111 , Max:131   ; Diastolic (24hrs), Av, Min:52, Max:78      Intake/Output Summary (Last 24 hours) at 17 0902  Last data filed at 17 0636   Gross per 24 hour   Intake           2683.5 ml   Output              776 ml   Net           1907.5 ml       Wound clean and dry with minimal or no drainage.  Surrounding skin with minimal erythema.          Data:   All laboratory data related to this surgery reviewed      Lab Results   Component Value Date     2017    POTASSIUM 4.1 2017    CHLORIDE 104 2017    CO2 25 2017     (H) 2017     Lab Results   Component Value Date    HGB 9.4 (L) 2017    HGB 10.2 (L) 2017    HGB 12.4 2017     Platelet Count (10e9/L)   Date Value   2017 255       All imaging studies related to this surgery reviewed         Assessment and Plan:    Assessment:  Doing well.  No immediate surgical complications identified.  No excessive bleeding  Pain well-controlled.  Nausea    Plan:  Continue physical therapy  Pain control measures  Discharge plan: Rehab tomorrow    Oliver Daniels MD        (1) Other Diagnosis

## 2024-10-02 ENCOUNTER — TRANSFERRED RECORDS (OUTPATIENT)
Dept: HEALTH INFORMATION MANAGEMENT | Facility: CLINIC | Age: 79
End: 2024-10-02
Payer: MEDICARE

## 2024-11-14 ENCOUNTER — TRANSFERRED RECORDS (OUTPATIENT)
Dept: HEALTH INFORMATION MANAGEMENT | Facility: CLINIC | Age: 79
End: 2024-11-14
Payer: MEDICARE

## (undated) DEVICE — GLOVE PROTEXIS POWDER FREE 8.5 ORTHOPEDIC 2D73ET85

## (undated) DEVICE — MANIFOLD NEPTUNE 4 PORT 700-20

## (undated) DEVICE — SU VICRYL 2-0 CP-1 27" UND J266H

## (undated) DEVICE — BONE CLEANING TIP INTERPULSE  0210-010-000

## (undated) DEVICE — CAST PADDING 6" UNSTERILE 9046

## (undated) DEVICE — TOURNIQUET CUFF 30" STERILE

## (undated) DEVICE — BLADE SAW SAGITTAL STRK 21X90X1.27MM HD SYS 6 6221-127-090

## (undated) DEVICE — GLOVE PROTEXIS POWDER FREE 8.0 ORTHOPEDIC 2D73ET80

## (undated) DEVICE — GLOVE PROTEXIS W/NEU-THERA 8.0  2D73TE80

## (undated) DEVICE — DRAIN ROUND W/RESERV KIT JACKSON PRATT 10FR 400ML SU130-402D

## (undated) DEVICE — DRSG XEROFORM 5X9" 8884431605

## (undated) DEVICE — IMM KNEE 20" 0814-2660

## (undated) DEVICE — DRAPE POUCH INSTRUMENT 1018

## (undated) DEVICE — BLADE SAW SAGITTAL STRK 29X83.5X1.27MM 4/2000 2108-183-000

## (undated) DEVICE — HOOD FLYTE W/PEELAWAY 408-800-100

## (undated) DEVICE — SU ETHIBOND 0 CTX CR  8X18" CX31D

## (undated) DEVICE — WRAP EZY KNEE

## (undated) DEVICE — LINEN TOWEL PACK X5 5464

## (undated) DEVICE — CAST PADDING 6" STERILE 9046S

## (undated) DEVICE — NDL SPINAL 18GA 3.5" 405184

## (undated) DEVICE — DRSG KERLIX FLUFFS X5

## (undated) DEVICE — ESU GROUND PAD UNIVERSAL W/O CORD

## (undated) DEVICE — SU WND CLOSURE VLOC 180 ABS 0 24" GS-25 VLOCL0436

## (undated) DEVICE — BONE CEMENT MIXEVAC III HI VAC KIT  0206-015-000

## (undated) DEVICE — CAST PADDING 4" UNSTERILE 9044

## (undated) DEVICE — SYR 30ML LL W/O NDL

## (undated) DEVICE — SUCTION IRR SYSTEM W/O TIP INTERPULSE HANDPIECE 0210-100-000

## (undated) DEVICE — PREP CHLORAPREP 26ML TINTED ORANGE  260815

## (undated) DEVICE — PACK TOTAL KNEE SOP15TKFSD

## (undated) DEVICE — SUCTION TIP YANKAUER W/O VENT K86

## (undated) DEVICE — GOWN IMPERVIOUS SPECIALTY XL/XLONG 39049

## (undated) DEVICE — GLOVE PROTEXIS W/NEU-THERA 8.5  2D73TE85

## (undated) RX ORDER — PROPOFOL 10 MG/ML
INJECTION, EMULSION INTRAVENOUS
Status: DISPENSED
Start: 2017-05-22

## (undated) RX ORDER — ACYCLOVIR 200 MG/1
CAPSULE ORAL
Status: DISPENSED
Start: 2017-05-22

## (undated) RX ORDER — KETOROLAC TROMETHAMINE 30 MG/ML
INJECTION, SOLUTION INTRAMUSCULAR; INTRAVENOUS
Status: DISPENSED
Start: 2017-05-22

## (undated) RX ORDER — FENTANYL CITRATE 50 UG/ML
INJECTION, SOLUTION INTRAMUSCULAR; INTRAVENOUS
Status: DISPENSED
Start: 2017-05-22

## (undated) RX ORDER — ROPIVACAINE HYDROCHLORIDE 2 MG/ML
INJECTION, SOLUTION EPIDURAL; INFILTRATION; PERINEURAL
Status: DISPENSED
Start: 2017-05-22

## (undated) RX ORDER — LIDOCAINE HYDROCHLORIDE 20 MG/ML
INJECTION, SOLUTION EPIDURAL; INFILTRATION; INTRACAUDAL; PERINEURAL
Status: DISPENSED
Start: 2017-05-22

## (undated) RX ORDER — ONDANSETRON 2 MG/ML
INJECTION INTRAMUSCULAR; INTRAVENOUS
Status: DISPENSED
Start: 2017-05-22